# Patient Record
Sex: MALE | Race: WHITE | NOT HISPANIC OR LATINO | Employment: OTHER | URBAN - METROPOLITAN AREA
[De-identification: names, ages, dates, MRNs, and addresses within clinical notes are randomized per-mention and may not be internally consistent; named-entity substitution may affect disease eponyms.]

---

## 2022-11-23 ENCOUNTER — DOCUMENTATION ONLY (OUTPATIENT)
Dept: HEMATOLOGY/ONCOLOGY | Facility: CLINIC | Age: 72
End: 2022-11-23
Payer: MEDICARE

## 2022-11-23 ENCOUNTER — LAB VISIT (OUTPATIENT)
Dept: LAB | Facility: HOSPITAL | Age: 72
End: 2022-11-23
Attending: INTERNAL MEDICINE
Payer: MEDICARE

## 2022-11-23 ENCOUNTER — OFFICE VISIT (OUTPATIENT)
Dept: HEMATOLOGY/ONCOLOGY | Facility: CLINIC | Age: 72
End: 2022-11-23
Payer: MEDICARE

## 2022-11-23 VITALS
RESPIRATION RATE: 20 BRPM | HEIGHT: 72 IN | TEMPERATURE: 98 F | DIASTOLIC BLOOD PRESSURE: 85 MMHG | WEIGHT: 281.31 LBS | SYSTOLIC BLOOD PRESSURE: 194 MMHG | OXYGEN SATURATION: 97 % | BODY MASS INDEX: 38.1 KG/M2 | HEART RATE: 67 BPM

## 2022-11-23 DIAGNOSIS — C84.44 PERIPHERAL T CELL LYMPHOMA OF AXILLARY LYMPH NODES: Primary | ICD-10-CM

## 2022-11-23 DIAGNOSIS — T14.8XXD WOUND HEALING, DELAYED: ICD-10-CM

## 2022-11-23 DIAGNOSIS — C84.44 PERIPHERAL T CELL LYMPHOMA OF AXILLARY LYMPH NODES: ICD-10-CM

## 2022-11-23 DIAGNOSIS — I10 PRIMARY HYPERTENSION: ICD-10-CM

## 2022-11-23 DIAGNOSIS — E11.9 TYPE 2 DIABETES MELLITUS WITHOUT COMPLICATION, WITHOUT LONG-TERM CURRENT USE OF INSULIN: ICD-10-CM

## 2022-11-23 DIAGNOSIS — M10.09 ACUTE IDIOPATHIC GOUT OF MULTIPLE SITES: ICD-10-CM

## 2022-11-23 LAB
ALBUMIN SERPL BCP-MCNC: 3.7 G/DL (ref 3.5–5.2)
ALP SERPL-CCNC: 70 U/L (ref 55–135)
ALT SERPL W/O P-5'-P-CCNC: 71 U/L (ref 10–44)
ANION GAP SERPL CALC-SCNC: 9 MMOL/L (ref 8–16)
ANISOCYTOSIS BLD QL SMEAR: SLIGHT
AST SERPL-CCNC: 33 U/L (ref 10–40)
BASOPHILS NFR BLD: 0 % (ref 0–1.9)
BILIRUB SERPL-MCNC: 0.8 MG/DL (ref 0.1–1)
BUN SERPL-MCNC: 14 MG/DL (ref 8–23)
CALCIUM SERPL-MCNC: 9.5 MG/DL (ref 8.7–10.5)
CHLORIDE SERPL-SCNC: 101 MMOL/L (ref 95–110)
CO2 SERPL-SCNC: 25 MMOL/L (ref 23–29)
CREAT SERPL-MCNC: 0.8 MG/DL (ref 0.5–1.4)
DIFFERENTIAL METHOD: ABNORMAL
EOSINOPHIL NFR BLD: 2 % (ref 0–8)
ERYTHROCYTE [DISTWIDTH] IN BLOOD BY AUTOMATED COUNT: 13.2 % (ref 11.5–14.5)
EST. GFR  (NO RACE VARIABLE): >60 ML/MIN/1.73 M^2
GLUCOSE SERPL-MCNC: 230 MG/DL (ref 70–110)
HCT VFR BLD AUTO: 28.4 % (ref 40–54)
HGB BLD-MCNC: 9.9 G/DL (ref 14–18)
HYPOCHROMIA BLD QL SMEAR: ABNORMAL
IMM GRANULOCYTES # BLD AUTO: ABNORMAL K/UL (ref 0–0.04)
IMM GRANULOCYTES NFR BLD AUTO: ABNORMAL % (ref 0–0.5)
LDH SERPL L TO P-CCNC: 131 U/L (ref 110–260)
LYMPHOCYTES NFR BLD: 76 % (ref 18–48)
MCH RBC QN AUTO: 33.3 PG (ref 27–31)
MCHC RBC AUTO-ENTMCNC: 34.9 G/DL (ref 32–36)
MCV RBC AUTO: 96 FL (ref 82–98)
MONOCYTES NFR BLD: 4 % (ref 4–15)
NEUTROPHILS NFR BLD: 18 % (ref 38–73)
NRBC BLD-RTO: 0 /100 WBC
OVALOCYTES BLD QL SMEAR: ABNORMAL
PHOSPHATE SERPL-MCNC: 3 MG/DL (ref 2.7–4.5)
PLATELET # BLD AUTO: 97 K/UL (ref 150–450)
PLATELET BLD QL SMEAR: ABNORMAL
PMV BLD AUTO: 11.3 FL (ref 9.2–12.9)
POIKILOCYTOSIS BLD QL SMEAR: SLIGHT
POTASSIUM SERPL-SCNC: 4.3 MMOL/L (ref 3.5–5.1)
PROT SERPL-MCNC: 6.7 G/DL (ref 6–8.4)
RBC # BLD AUTO: 2.97 M/UL (ref 4.6–6.2)
SODIUM SERPL-SCNC: 135 MMOL/L (ref 136–145)
URATE SERPL-MCNC: 5.3 MG/DL (ref 3.4–7)
WBC # BLD AUTO: 0.26 K/UL (ref 3.9–12.7)

## 2022-11-23 PROCEDURE — 36415 COLL VENOUS BLD VENIPUNCTURE: CPT | Performed by: INTERNAL MEDICINE

## 2022-11-23 PROCEDURE — 84550 ASSAY OF BLOOD/URIC ACID: CPT | Performed by: INTERNAL MEDICINE

## 2022-11-23 PROCEDURE — 99205 PR OFFICE/OUTPT VISIT, NEW, LEVL V, 60-74 MIN: ICD-10-PCS | Mod: S$PBB,,, | Performed by: INTERNAL MEDICINE

## 2022-11-23 PROCEDURE — 85027 COMPLETE CBC AUTOMATED: CPT | Performed by: INTERNAL MEDICINE

## 2022-11-23 PROCEDURE — 85007 BL SMEAR W/DIFF WBC COUNT: CPT | Performed by: INTERNAL MEDICINE

## 2022-11-23 PROCEDURE — 83615 LACTATE (LD) (LDH) ENZYME: CPT | Performed by: INTERNAL MEDICINE

## 2022-11-23 PROCEDURE — 99203 OFFICE O/P NEW LOW 30 MIN: CPT | Mod: PBBFAC | Performed by: INTERNAL MEDICINE

## 2022-11-23 PROCEDURE — 99999 PR PBB SHADOW E&M-NEW PATIENT-LVL III: CPT | Mod: PBBFAC,,, | Performed by: INTERNAL MEDICINE

## 2022-11-23 PROCEDURE — 80053 COMPREHEN METABOLIC PANEL: CPT | Performed by: INTERNAL MEDICINE

## 2022-11-23 PROCEDURE — 99999 PR PBB SHADOW E&M-NEW PATIENT-LVL III: ICD-10-PCS | Mod: PBBFAC,,, | Performed by: INTERNAL MEDICINE

## 2022-11-23 PROCEDURE — 99205 OFFICE O/P NEW HI 60 MIN: CPT | Mod: S$PBB,,, | Performed by: INTERNAL MEDICINE

## 2022-11-23 PROCEDURE — 84100 ASSAY OF PHOSPHORUS: CPT | Performed by: INTERNAL MEDICINE

## 2022-11-23 RX ORDER — SULFAMETHOXAZOLE AND TRIMETHOPRIM 800; 160 MG/1; MG/1
1 TABLET ORAL
COMMUNITY
Start: 2022-11-16 | End: 2023-01-23 | Stop reason: SDUPTHER

## 2022-11-23 RX ORDER — TRIAMCINOLONE ACETONIDE 1 MG/G
CREAM TOPICAL
COMMUNITY
Start: 2022-09-26

## 2022-11-23 RX ORDER — MUPIROCIN 20 MG/G
OINTMENT TOPICAL
COMMUNITY
Start: 2022-07-04

## 2022-11-23 RX ORDER — OXYCODONE HYDROCHLORIDE 15 MG/1
7.5 TABLET ORAL
COMMUNITY
Start: 2022-10-28

## 2022-11-23 RX ORDER — LISINOPRIL 10 MG/1
10 TABLET ORAL
COMMUNITY
Start: 2022-07-05 | End: 2023-06-01

## 2022-11-23 RX ORDER — PROCHLORPERAZINE MALEATE 10 MG
10 TABLET ORAL
COMMUNITY
Start: 2022-10-28

## 2022-11-23 RX ORDER — CLOBETASOL PROPIONATE 0.5 MG/G
OINTMENT TOPICAL
COMMUNITY
Start: 2022-07-04

## 2022-11-23 RX ORDER — ONDANSETRON HYDROCHLORIDE 8 MG/1
8 TABLET, FILM COATED ORAL
COMMUNITY
Start: 2022-10-28

## 2022-11-23 RX ORDER — FAMOTIDINE 20 MG/1
20 TABLET, FILM COATED ORAL
COMMUNITY
Start: 2022-10-28 | End: 2023-05-18

## 2022-11-23 RX ORDER — VALACYCLOVIR HYDROCHLORIDE 500 MG/1
500 TABLET, FILM COATED ORAL
COMMUNITY
Start: 2022-10-28 | End: 2023-01-27 | Stop reason: SDUPTHER

## 2022-11-23 RX ORDER — ALLOPURINOL 300 MG/1
300 TABLET ORAL
COMMUNITY
Start: 2022-11-16 | End: 2023-01-13 | Stop reason: SDUPTHER

## 2022-11-23 NOTE — PLAN OF CARE
START ON PATHWAY REGIMEN - Lymphoma and CLL    [id number]        Cyclophosphamide       Doxorubicin       Prednisone       Brentuximab vedotin (Adcetris)           Additional Orders: LVEF assessment prior to initiation of doxorubicin   and as clinically indicated. Max recommended cumulative doxorubicin dose = 450   mg/m2. In the Chrissy CORMIER et al. study, patients received 6 to 8 cycles.    **Always confirm dose/schedule in your pharmacy ordering system**    Patient Characteristics:  T-Cell Lymphoma (Systemic), First Line, AITL (Angioimmunoblastic T-Cell   Lymphoma) or ALCL (Anaplastic Large Cell Lymphoma) or Peripheral T-Cell, NOS,   CD30 Positive  Disease Type: Not Applicable  Disease Type: T-Cell Lymphoma (Systemic)  Disease Type: Not Applicable  Line of Therapy: First Line  T-Cell Lymphoma Subtype: Peripheral T-Cell Lymphoma, NOS  CD30 Status: CD30 Positive  Intent of Therapy:  Curative Intent, Discussed with Patient

## 2022-11-23 NOTE — PROGRESS NOTES
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 Has received the first 2 cycles of chemo at [facility] and recently relocated to [place] at least temporarily and would like to continue therapy. Currently residing with ex-wife and ex-sister in law.      Hematologic History of his Mycosis fungoides:  --2020: developed rash on legs, initially treated as ringworm with local physician  --9/16/21: Biopsy with local dermatologist with [facility], read at [facility] per below, initially treated with silvadene. Pt did not have insurance so no further work-up was performed.  --10/2021: Started developing tumors, did not see dermatologist again due to insurance issues, they sent rx for halobetasol and valchlor. He was not able to start valchlor due to cost.  --1/2022: Started tanning bed, tumors worsening    --PET on 9/26/22:  1. New and increased lymphadenopathy left axilla, right groin, and right popliteal fossa.  2. New F-18 FDG avid intramuscular mass in the right calf.   3. Increased size and avidity of the subcutaneous left gluteal cleft lesion.   4. Decreased size and avidity of cutaneous lesions at the left eyelid, left upper back, left arm, right lower abdominal wall, bilateral groin, and left foot.   5. Decreased/resolved mildly F-18 FDG avid left cervical lymph nodes.     10/4/2022 - 10/5/2022 TX-Radiation Therapy   Left inner thigh 8Gy in 2 fractions     Reportedly hospitalized the week of 10/24/22 for worsening skin infections and received CHP+ Brentuximab while inpatient.     C1D1: 10/28, C2D1:11/17    He continues to endorse ongoing open wounds to extremities, which he keeps covered with bandages for a week + at a time with a single bandage. + drainage from  lesions.    Experienced generalized itching for several days after PET scan    PAST MEDICAL HISTORY:     Diabetes  Gout  Hypertension    PAST SURGICAL HISTORY:   No past surgical history on file.    PAST SOCIAL HISTORY:       FAMILY HISTORY:  No family history on file.    CURRENT MEDICATIONS:   No current outpatient medications on file.     No current facility-administered medications for this visit.     ALLERGIES:   Review of patient's allergies indicates:  Not on File      Review of Systems:     Review of Systems   Constitutional:  Positive for fatigue. Negative for appetite change, chills, diaphoresis, fever and unexpected weight change.   HENT:   Negative for hearing loss, mouth sores, nosebleeds, sore throat, trouble swallowing and voice change.    Eyes:  Negative for eye problems and icterus.   Respiratory:  Negative for chest tightness, cough, hemoptysis, shortness of breath and wheezing.    Cardiovascular:  Negative for chest pain, leg swelling and palpitations.   Gastrointestinal:  Negative for abdominal distention, abdominal pain, blood in stool, diarrhea, nausea and vomiting.   Endocrine: Negative for hot flashes.   Genitourinary:  Negative for bladder incontinence, difficulty urinating, dysuria and hematuria.    Musculoskeletal:  Negative for arthralgias, back pain, flank pain, gait problem, myalgias, neck pain and neck stiffness.   Skin:  Positive for itching and wound. Negative for rash.   Neurological:  Negative for dizziness, extremity weakness, gait problem, headaches, numbness, seizures and speech difficulty.   Hematological:  Negative for adenopathy. Does not bruise/bleed easily.   Psychiatric/Behavioral:  Negative for confusion, depression and sleep disturbance. The patient is not nervous/anxious.         Physical Exam:     Vitals:    11/23/22 1334   BP: (!) 194/85   Pulse: 67   Resp: 20   Temp: 98.4 °F (36.9 °C)     Physical Exam  Constitutional:       General: He is not in acute distress.      Appearance: He is well-developed. He is not diaphoretic.   HENT:      Head: Normocephalic and atraumatic.      Mouth/Throat:      Pharynx: No oropharyngeal exudate.   Eyes:      Conjunctiva/sclera: Conjunctivae normal.      Pupils: Pupils are equal, round, and reactive to light.   Neck:      Thyroid: No thyromegaly.      Vascular: No JVD.      Trachea: No tracheal deviation.   Cardiovascular:      Rate and Rhythm: Normal rate and regular rhythm.      Heart sounds: Normal heart sounds. No murmur heard.    No friction rub.   Pulmonary:      Effort: Pulmonary effort is normal. No respiratory distress.      Breath sounds: Normal breath sounds. No stridor. No wheezing or rales.   Chest:      Chest wall: No tenderness.   Abdominal:      General: Bowel sounds are normal. There is no distension.      Palpations: Abdomen is soft.      Tenderness: There is no abdominal tenderness. There is no guarding or rebound.   Musculoskeletal:         General: No tenderness or deformity. Normal range of motion.      Cervical back: Normal range of motion and neck supple.   Skin:     General: Skin is warm and dry.      Capillary Refill: Capillary refill takes less than 2 seconds.      Coloration: Skin is not pale.      Findings: Lesion present. No erythema or rash.      Comments: Numerous areas covered by large bandages. Evidence of drainage soaking the bandage   Neurological:      Mental Status: He is alert and oriented to person, place, and time.      Cranial Nerves: No cranial nerve deficit.      Sensory: No sensory deficit.      Motor: No abnormal muscle tone.      Coordination: Coordination normal.      Deep Tendon Reflexes: Reflexes normal.   Psychiatric:         Behavior: Behavior normal.         Thought Content: Thought content normal.         Judgment: Judgment normal.       ECOG Performance Status: (foot note - ECOG PS provided by Eastern Cooperative Oncology Group) 0 - Asymptomatic    Karnofsky Performance Score:  100%- Normal,  No Complaints, No Evidence of Disease    Labs:   Lab Results   Component Value Date    WBC 0.26 (LL) 11/23/2022    HGB 9.9 (L) 11/23/2022    HCT 28.4 (L) 11/23/2022    PLT 97 (L) 11/23/2022    TRIG 329 (H) 10/19/2022    ALT 71 (H) 11/23/2022    AST 33 11/23/2022     (L) 11/23/2022    K 4.3 11/23/2022     11/23/2022    CREATININE 0.8 11/23/2022    BUN 14 11/23/2022    CO2 25 11/23/2022    HGBA1C 6.0 (H) 10/26/2022         Imaging: Previous imaging has been reviewed     Assessment and Plan:     Mr. Armstrong is a pleasant 72 year old male with  peripheral T cell lymphoma    Peripheral T Cell Lymphoma  --stage IIB ([id number])   --lymph node biopsy as PERIPHERAL T-CELL LYMPHOMA WITH EXTENSIVE NECROSIS-  --Plan to continue CHP-BV for 4 more cycles [received first 2 cycles at [facility]]  --On triple prophylaxis from [facility]  --Plan for port prior to next treatment  --See me prior to next cycle for chemo consent at this facility  --Advised to receive the remainder of therapy at the same facility    DVT  --On eliquis    Hypertension  --Currently taking lisinopril  --Poorly controlled based on our reads  --Will add additional agent      75 minutes were spent face to face with the patient to discuss the disease, natural history, treatment options and survival statistics. I have provided the patient with an opportunity to ask questions and have all questions answered to his satisfaction.       he will return to clinic the day of therapy, but knows to call in the interim if symptoms change or should a problem arise.        Teodora Rashid MD  Hematology and Medical Oncology  Bone Marrow Transplant  [facility] [[email or web address]]      BMT Chart Routing  Urgent    Follow up with physician . 1. port placement 2.echo 3.insurance auth and schedule brentux + CVP chemo for 12/8  4. see me on 12/7 with labs [okay ot over book me if needed]   Follow up with MICHELLE    Provider visit type    Infusion scheduling note    Injection scheduling note     Labs CBC, CMP, LDH and uric acid   Lab interval:     Imaging    Pharmacy appointment    Other referrals

## 2022-11-28 PROBLEM — M10.9 GOUT OF MULTIPLE SITES: Status: ACTIVE | Noted: 2022-11-28

## 2022-11-28 PROBLEM — T14.8XXD WOUND HEALING, DELAYED: Status: ACTIVE | Noted: 2022-11-28

## 2022-11-28 PROBLEM — I10 HTN (HYPERTENSION): Status: ACTIVE | Noted: 2022-11-28

## 2022-11-28 PROBLEM — E11.9 TYPE 2 DIABETES MELLITUS WITHOUT COMPLICATION, WITHOUT LONG-TERM CURRENT USE OF INSULIN: Status: ACTIVE | Noted: 2022-11-28

## 2022-11-30 DIAGNOSIS — C84.44 PERIPHERAL T CELL LYMPHOMA OF AXILLARY LYMPH NODES: Primary | ICD-10-CM

## 2022-12-01 ENCOUNTER — TELEPHONE (OUTPATIENT)
Dept: HEMATOLOGY/ONCOLOGY | Facility: CLINIC | Age: 72
End: 2022-12-01
Payer: MEDICARE

## 2022-12-05 ENCOUNTER — HOSPITAL ENCOUNTER (OUTPATIENT)
Dept: CARDIOLOGY | Facility: HOSPITAL | Age: 72
Discharge: HOME | End: 2022-12-05
Attending: INTERNAL MEDICINE
Payer: MEDICARE

## 2022-12-05 VITALS
DIASTOLIC BLOOD PRESSURE: 85 MMHG | HEIGHT: 72 IN | WEIGHT: 281 LBS | BODY MASS INDEX: 38.06 KG/M2 | SYSTOLIC BLOOD PRESSURE: 194 MMHG | HEART RATE: 88 BPM

## 2022-12-05 DIAGNOSIS — C84.44 PERIPHERAL T CELL LYMPHOMA OF AXILLARY LYMPH NODES: ICD-10-CM

## 2022-12-05 LAB
ASCENDING AORTA: 3.6 CM
AV INDEX (PROSTH): 0.65
AV MEAN GRADIENT: 9 MMHG
AV PEAK GRADIENT: 14 MMHG
AV VALVE AREA: 2.12 CM2
AV VELOCITY RATIO: 0.55
BSA FOR ECHO PROCEDURE: 2.54 M2
CV ECHO LV RWT: 0.45 CM
DOP CALC AO PEAK VEL: 1.88 M/S
DOP CALC AO VTI: 34.83 CM
DOP CALC LVOT AREA: 3.3 CM2
DOP CALC LVOT DIAMETER: 2.04 CM
DOP CALC LVOT PEAK VEL: 1.04 M/S
DOP CALC LVOT STROKE VOLUME: 73.8 CM3
DOP CALCLVOT PEAK VEL VTI: 22.59 CM
E WAVE DECELERATION TIME: 227.98 MSEC
E/A RATIO: 0.73
E/E' RATIO: 8.74 M/S
ECHO LV POSTERIOR WALL: 0.95 CM (ref 0.6–1.1)
EJECTION FRACTION: 60 %
FRACTIONAL SHORTENING: 46 % (ref 28–44)
INTERVENTRICULAR SEPTUM: 0.9 CM (ref 0.6–1.1)
IVRT: 85.63 MSEC
LA MAJOR: 5.5 CM
LA MINOR: 5.49 CM
LA WIDTH: 4.96 CM
LEFT ATRIUM SIZE: 3.77 CM
LEFT ATRIUM VOLUME INDEX MOD: 43 ML/M2
LEFT ATRIUM VOLUME INDEX: 35.5 ML/M2
LEFT ATRIUM VOLUME MOD: 105.72 CM3
LEFT ATRIUM VOLUME: 87.34 CM3
LEFT INTERNAL DIMENSION IN SYSTOLE: 2.32 CM (ref 2.1–4)
LEFT VENTRICLE DIASTOLIC VOLUME INDEX: 33.1 ML/M2
LEFT VENTRICLE DIASTOLIC VOLUME: 81.42 ML
LEFT VENTRICLE MASS INDEX: 51 G/M2
LEFT VENTRICLE SYSTOLIC VOLUME INDEX: 7.5 ML/M2
LEFT VENTRICLE SYSTOLIC VOLUME: 18.5 ML
LEFT VENTRICULAR INTERNAL DIMENSION IN DIASTOLE: 4.26 CM (ref 3.5–6)
LEFT VENTRICULAR MASS: 126.06 G
LV LATERAL E/E' RATIO: 7.55 M/S
LV SEPTAL E/E' RATIO: 10.38 M/S
MV A" WAVE DURATION": 12.56 MSEC
MV PEAK A VEL: 1.14 M/S
MV PEAK E VEL: 0.83 M/S
MV STENOSIS PRESSURE HALF TIME: 66.12 MS
MV VALVE AREA P 1/2 METHOD: 3.33 CM2
PISA TR MAX VEL: 2.22 M/S
PULM VEIN S/D RATIO: 1.59
PV PEAK D VEL: 0.49 M/S
PV PEAK S VEL: 0.78 M/S
QEF: 61 %
RA MAJOR: 4.14 CM
RA PRESSURE: 3 MMHG
RA WIDTH: 3.64 CM
RIGHT VENTRICULAR END-DIASTOLIC DIMENSION: 3.88 CM
RV TISSUE DOPPLER FREE WALL SYSTOLIC VELOCITY 1 (APICAL 4 CHAMBER VIEW): 17.64 CM/S
SINUS: 2.64 CM
STJ: 2.73 CM
TDI LATERAL: 0.11 M/S
TDI SEPTAL: 0.08 M/S
TDI: 0.1 M/S
TR MAX PG: 20 MMHG
TRICUSPID ANNULAR PLANE SYSTOLIC EXCURSION: 2.89 CM
TV REST PULMONARY ARTERY PRESSURE: 23 MMHG

## 2022-12-05 PROCEDURE — 93356 MYOCRD STRAIN IMG SPCKL TRCK: CPT | Mod: ,,, | Performed by: INTERNAL MEDICINE

## 2022-12-05 PROCEDURE — 93356 ECHO (CUPID ONLY): ICD-10-PCS | Mod: ,,, | Performed by: INTERNAL MEDICINE

## 2022-12-05 PROCEDURE — 93306 TTE W/DOPPLER COMPLETE: CPT | Mod: 26,,, | Performed by: INTERNAL MEDICINE

## 2022-12-05 PROCEDURE — 93306 ECHO (CUPID ONLY): ICD-10-PCS | Mod: 26,,, | Performed by: INTERNAL MEDICINE

## 2022-12-05 PROCEDURE — 93356 MYOCRD STRAIN IMG SPCKL TRCK: CPT

## 2022-12-07 ENCOUNTER — OFFICE VISIT (OUTPATIENT)
Dept: HEMATOLOGY/ONCOLOGY | Facility: CLINIC | Age: 72
End: 2022-12-07
Payer: MEDICARE

## 2022-12-07 ENCOUNTER — NURSE TRIAGE (OUTPATIENT)
Dept: ADMINISTRATIVE | Facility: CLINIC | Age: 72
End: 2022-12-07
Payer: MEDICARE

## 2022-12-07 ENCOUNTER — PATIENT MESSAGE (OUTPATIENT)
Dept: HEMATOLOGY/ONCOLOGY | Facility: CLINIC | Age: 72
End: 2022-12-07

## 2022-12-07 VITALS
TEMPERATURE: 98 F | OXYGEN SATURATION: 98 % | BODY MASS INDEX: 37.12 KG/M2 | SYSTOLIC BLOOD PRESSURE: 128 MMHG | DIASTOLIC BLOOD PRESSURE: 72 MMHG | HEIGHT: 72 IN | RESPIRATION RATE: 17 BRPM | WEIGHT: 274.06 LBS | HEART RATE: 86 BPM

## 2022-12-07 DIAGNOSIS — I10 PRIMARY HYPERTENSION: ICD-10-CM

## 2022-12-07 DIAGNOSIS — E11.9 TYPE 2 DIABETES MELLITUS WITHOUT COMPLICATION, WITHOUT LONG-TERM CURRENT USE OF INSULIN: ICD-10-CM

## 2022-12-07 DIAGNOSIS — C84.44 PERIPHERAL T CELL LYMPHOMA OF AXILLARY LYMPH NODES: ICD-10-CM

## 2022-12-07 DIAGNOSIS — C84.09 MYCOSIS FUNGOIDES, EXTRANODAL AND SOLID ORGAN SITES: Primary | ICD-10-CM

## 2022-12-07 PROCEDURE — 99215 OFFICE O/P EST HI 40 MIN: CPT | Mod: S$PBB,,, | Performed by: INTERNAL MEDICINE

## 2022-12-07 PROCEDURE — 99213 OFFICE O/P EST LOW 20 MIN: CPT | Mod: PBBFAC | Performed by: INTERNAL MEDICINE

## 2022-12-07 PROCEDURE — 99999 PR PBB SHADOW E&M-EST. PATIENT-LVL III: ICD-10-PCS | Mod: PBBFAC,,, | Performed by: INTERNAL MEDICINE

## 2022-12-07 PROCEDURE — 99215 PR OFFICE/OUTPT VISIT, EST, LEVL V, 40-54 MIN: ICD-10-PCS | Mod: S$PBB,,, | Performed by: INTERNAL MEDICINE

## 2022-12-07 PROCEDURE — 99999 PR PBB SHADOW E&M-EST. PATIENT-LVL III: CPT | Mod: PBBFAC,,, | Performed by: INTERNAL MEDICINE

## 2022-12-07 NOTE — PROGRESS NOTES
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 Has received the first 2 cycles of chemo at [facility] and recently relocated to [place] at least temporarily and would like to continue therapy. Currently residing with ex-wife and ex-sister in law.      Hematologic History of his Mycosis fungoides:  --2020: developed rash on legs, initially treated as ringworm with local physician  --9/16/21: Biopsy with local dermatologist with [facility], read at [facility] per below, initially treated with silvadene. Pt did not have insurance so no further work-up was performed.  --10/2021: Started developing tumors, did not see dermatologist again due to insurance issues, they sent rx for halobetasol and valchlor. He was not able to start valchlor due to cost.  --1/2022: Started tanning bed, tumors worsening    --PET on 9/26/22:  1. New and increased lymphadenopathy left axilla, right groin, and right popliteal fossa.  2. New F-18 FDG avid intramuscular mass in the right calf.   3. Increased size and avidity of the subcutaneous left gluteal cleft lesion.   4. Decreased size and avidity of cutaneous lesions at the left eyelid, left upper back, left arm, right lower abdominal wall, bilateral groin, and left foot.   5. Decreased/resolved mildly F-18 FDG avid left cervical lymph nodes.     10/4/2022 - 10/5/2022 TX-Radiation Therapy   Left inner thigh 8Gy in 2 fractions     Reportedly hospitalized the week of 10/24/22 for worsening skin infections and received CHP+ Brentuximab while inpatient.     C1D1: 10/28, C2D1:11/17    He continues to endorse ongoing open wounds to extremities, which he keeps covered with bandages for a week + at a time with a single bandage. + drainage from lesions.    Experienced generalized itching for several  days after PET scan    Interval History:  Mr. Armstrong is doing well. Wounds continue to heal. Left axillary mass has become more painful, larger and closer to the surface. He's scared the lesion will break through the surface.     PAST MEDICAL HISTORY:     Diabetes  Gout  Hypertension    PAST SURGICAL HISTORY:   No past surgical history on file.    PAST SOCIAL HISTORY:       FAMILY HISTORY:  No family history on file.    CURRENT MEDICATIONS:   Current Outpatient Medications   Medication Sig    allopurinoL (ZYLOPRIM) 300 MG tablet Take 300 mg by mouth.    apixaban (ELIQUIS) 5 mg Tab Take 5 mg by mouth.    clobetasol 0.05% (TEMOVATE) 0.05 % Oint Apply topically.    famotidine (PEPCID) 20 MG tablet Take 20 mg by mouth.    lisinopriL 10 MG tablet Take 10 mg by mouth.    mupirocin (BACTROBAN) 2 % ointment Apply topically.    ondansetron (ZOFRAN) 8 MG tablet Take 8 mg by mouth.    oxyCODONE (ROXICODONE) 15 MG Tab Take 7.5 mg by mouth.    prochlorperazine (COMPAZINE) 10 MG tablet Take 10 mg by mouth.    sulfamethoxazole-trimethoprim 800-160mg (BACTRIM DS) 800-160 mg Tab Take 1 tablet by mouth.    triamcinolone acetonide 0.1% (KENALOG) 0.1 % cream Apply topically.    valACYclovir (VALTREX) 500 MG tablet Take 500 mg by mouth.     No current facility-administered medications for this visit.     ALLERGIES:   Review of patient's allergies indicates:   Allergen Reactions    Iodinated contrast media Other (See Comments)     Pt states that he looks sunburnt afterwards    Vancomycin analogues Other (See Comments)     Red man syndrome         Review of Systems:     Review of Systems   Constitutional:  Positive for fatigue. Negative for appetite change, chills, diaphoresis, fever and unexpected weight change.   HENT:   Negative for hearing loss, mouth sores, nosebleeds, sore throat, trouble swallowing and voice change.    Eyes:  Negative for eye problems and icterus.   Respiratory:  Negative for chest tightness, cough, hemoptysis,  shortness of breath and wheezing.    Cardiovascular:  Negative for chest pain, leg swelling and palpitations.   Gastrointestinal:  Negative for abdominal distention, abdominal pain, blood in stool, diarrhea, nausea and vomiting.   Endocrine: Negative for hot flashes.   Genitourinary:  Negative for bladder incontinence, difficulty urinating, dysuria and hematuria.    Musculoskeletal:  Negative for arthralgias, back pain, flank pain, gait problem, myalgias, neck pain and neck stiffness.   Skin:  Positive for itching and wound. Negative for rash.   Neurological:  Negative for dizziness, extremity weakness, gait problem, headaches, numbness, seizures and speech difficulty.   Hematological:  Negative for adenopathy. Does not bruise/bleed easily.   Psychiatric/Behavioral:  Negative for confusion, depression and sleep disturbance. The patient is not nervous/anxious.         Physical Exam:     Vitals:    12/07/22 0930   BP: 128/72   Pulse: 86   Resp: 17   Temp: 98.3 °F (36.8 °C)     Physical Exam  Constitutional:       General: He is not in acute distress.     Appearance: He is well-developed. He is not diaphoretic.   HENT:      Head: Normocephalic and atraumatic.      Mouth/Throat:      Pharynx: No oropharyngeal exudate.   Eyes:      Conjunctiva/sclera: Conjunctivae normal.      Pupils: Pupils are equal, round, and reactive to light.   Neck:      Thyroid: No thyromegaly.      Vascular: No JVD.      Trachea: No tracheal deviation.   Cardiovascular:      Rate and Rhythm: Normal rate and regular rhythm.      Heart sounds: Normal heart sounds. No murmur heard.    No friction rub.   Pulmonary:      Effort: Pulmonary effort is normal. No respiratory distress.      Breath sounds: Normal breath sounds. No stridor. No wheezing or rales.   Chest:      Chest wall: No tenderness.   Abdominal:      General: Bowel sounds are normal. There is no distension.      Palpations: Abdomen is soft.      Tenderness: There is no abdominal  tenderness. There is no guarding or rebound.   Musculoskeletal:         General: No tenderness or deformity. Normal range of motion.      Cervical back: Normal range of motion and neck supple.      Comments: + axillary mass. Red tender and hard   Skin:     General: Skin is warm and dry.      Capillary Refill: Capillary refill takes less than 2 seconds.      Coloration: Skin is not pale.      Findings: Lesion present. No erythema or rash.      Comments: Numerous areas covered by large bandages. Evidence of drainage soaking the bandage   Neurological:      Mental Status: He is alert and oriented to person, place, and time.      Cranial Nerves: No cranial nerve deficit.      Sensory: No sensory deficit.      Motor: No abnormal muscle tone.      Coordination: Coordination normal.      Deep Tendon Reflexes: Reflexes normal.   Psychiatric:         Behavior: Behavior normal.         Thought Content: Thought content normal.         Judgment: Judgment normal.       ECOG Performance Status: (foot note - ECOG PS provided by Eastern Cooperative Oncology Group) 0 - Asymptomatic    Karnofsky Performance Score:  100%- Normal, No Complaints, No Evidence of Disease    Labs:   Lab Results   Component Value Date    WBC 5.38 12/05/2022    HGB 10.6 (L) 12/05/2022    HCT 32.2 (L) 12/05/2022     12/05/2022    TRIG 329 (H) 10/19/2022    ALT 42 12/05/2022    AST 41 (H) 12/05/2022     (L) 12/05/2022    K 4.7 12/05/2022     12/05/2022    CREATININE 0.9 12/05/2022    BUN 12 12/05/2022    CO2 25 12/05/2022    HGBA1C 6.0 (H) 10/26/2022         Imaging: Previous imaging has been reviewed     Assessment and Plan:     Mr. Armstrong is a pleasant 72 year old male with  peripheral T cell lymphoma    Peripheral T Cell Lymphoma  --stage IIB ([id number])   --lymph node biopsy as PERIPHERAL T-CELL LYMPHOMA WITH EXTENSIVE NECROSIS-  --Plan to continue CHP-BV for 4 more cycles [received first 2 cycles at [facility]]  --On triple prophylaxis  from [facility]  --Plan for port tomorrow  --chemo consent at this facility signed today  --Advised to receive the remainder of therapy at the same facility  --Will consult radiation oncology for acute worsening of left axillary tumor pain    DVT  --On eliquis    Hypertension  --Currently taking lisinopril  --Poorly controlled based on our reads  --Will add additional agent      75 minutes were spent face to face with the patient to discuss the disease, natural history, treatment options and survival statistics. I have provided the patient with an opportunity to ask questions and have all questions answered to his satisfaction.       he will return to clinic the day of therapy, but knows to call in the interim if symptoms change or should a problem arise.        Teodora Rashid MD  Hematology and Medical Oncology  Bone Marrow Transplant  [facility]      BMT Chart Routing  Urgent    Follow up with physician . 1. consult rad onc 2. all other follow up made   Follow up with MICHELLE    Provider visit type    Infusion scheduling note    Injection scheduling note    Labs    Imaging    Pharmacy appointment    Other referrals

## 2022-12-08 ENCOUNTER — OFFICE VISIT (OUTPATIENT)
Dept: URGENT CARE | Facility: CLINIC | Age: 72
End: 2022-12-08
Payer: MEDICARE

## 2022-12-08 VITALS
DIASTOLIC BLOOD PRESSURE: 92 MMHG | WEIGHT: 274 LBS | BODY MASS INDEX: 37.11 KG/M2 | SYSTOLIC BLOOD PRESSURE: 178 MMHG | OXYGEN SATURATION: 97 % | HEART RATE: 67 BPM | HEIGHT: 72 IN | TEMPERATURE: 97 F | RESPIRATION RATE: 18 BRPM

## 2022-12-08 DIAGNOSIS — L02.412 ABSCESS OF LEFT AXILLA: Primary | ICD-10-CM

## 2022-12-08 PROCEDURE — 87070 CULTURE OTHR SPECIMN AEROBIC: CPT | Performed by: FAMILY MEDICINE

## 2022-12-08 PROCEDURE — 99203 OFFICE O/P NEW LOW 30 MIN: CPT | Mod: S$GLB,,, | Performed by: FAMILY MEDICINE

## 2022-12-08 PROCEDURE — 99203 PR OFFICE/OUTPT VISIT, NEW, LEVL III, 30-44 MIN: ICD-10-PCS | Mod: S$GLB,,, | Performed by: FAMILY MEDICINE

## 2022-12-08 RX ORDER — SULFAMETHOXAZOLE AND TRIMETHOPRIM 800; 160 MG/1; MG/1
1 TABLET ORAL 2 TIMES DAILY
Qty: 20 TABLET | Refills: 0 | Status: SHIPPED | OUTPATIENT
Start: 2022-12-08 | End: 2022-12-13

## 2022-12-08 NOTE — PROGRESS NOTES
Subjective:       Patient ID: Randy Armstrong is a 72 y.o. male.    Vitals:  height is 6' (1.829 m) and weight is 124.3 kg (274 lb). His temperature is 97.1 °F (36.2 °C). His blood pressure is 178/92 (abnormal) and his pulse is 67. His respiration is 18 and oxygen saturation is 97%.     Chief Complaint: Abscess (3 months, but burst on 12/07/2022)    Pt has hx of skin ca, mycosis fungoides, got [facility] x 2 at [facility] with c/o having started draining since yesterday, denies fever    Abscess  Chronicity:  NewProgression Since Onset: rapidly worsening  Location:  Shoulder/arm  Characteristics: draining, painful, redness and swelling    Pain Scale:  8/10  Treatments Tried:  Draining/squeezing  Relieved by:  Nothing  Worsened by:  Nothing    Skin:  Positive for abscess. Negative for wound.     Objective:      Physical Exam   Constitutional: He is oriented to person, place, and time. He appears well-developed. He is cooperative.  Non-toxic appearance. He does not appear ill. No distress.   HENT:   Head: Normocephalic and atraumatic.   Ears:   Right Ear: Hearing, tympanic membrane, external ear and ear canal normal.   Left Ear: Hearing, tympanic membrane, external ear and ear canal normal.   Nose: Nose normal. No mucosal edema, rhinorrhea or nasal deformity. No epistaxis. Right sinus exhibits no maxillary sinus tenderness and no frontal sinus tenderness. Left sinus exhibits no maxillary sinus tenderness and no frontal sinus tenderness.   Mouth/Throat: Uvula is midline, oropharynx is clear and moist and mucous membranes are normal. Mucous membranes are moist. No trismus in the jaw. Normal dentition. No uvula swelling. No posterior oropharyngeal erythema. Oropharynx is clear.   Eyes: Conjunctivae and lids are normal. Right eye exhibits no discharge. Left eye exhibits no discharge. No scleral icterus.   Neck: Trachea normal and phonation normal. Neck supple.   Cardiovascular: Normal rate, regular rhythm, normal heart sounds and  normal pulses.   Pulmonary/Chest: Effort normal and breath sounds normal. No respiratory distress.   Abdominal: Normal appearance and bowel sounds are normal. He exhibits no distension and no mass. Soft. There is no abdominal tenderness.   Musculoskeletal: Normal range of motion.         General: No deformity. Normal range of motion.   Neurological: He is alert and oriented to person, place, and time. He exhibits normal muscle tone. Coordination normal.   Skin: Skin is warm, dry, intact, not diaphoretic and not pale.         Comments: Left axilla with small opening, started draining  yellowish drainage, on raising left arm  No induration appreciated     Psychiatric: His speech is normal and behavior is normal. Judgment and thought content normal.   Nursing note and vitals reviewed.      Assessment:       1. Abscess of left axilla          Plan:         Abscess of left axilla           Pt advised to follow up with heme onc specialist

## 2022-12-10 LAB — BACTERIA SPEC AEROBE CULT: NORMAL

## 2022-12-13 ENCOUNTER — TELEPHONE (OUTPATIENT)
Dept: URGENT CARE | Facility: CLINIC | Age: 72
End: 2022-12-13
Payer: MEDICARE

## 2022-12-22 ENCOUNTER — TELEPHONE (OUTPATIENT)
Dept: INTERVENTIONAL RADIOLOGY/VASCULAR | Facility: CLINIC | Age: 72
End: 2022-12-22
Payer: MEDICARE

## 2022-12-22 ENCOUNTER — OFFICE VISIT (OUTPATIENT)
Dept: HEMATOLOGY/ONCOLOGY | Facility: CLINIC | Age: 72
End: 2022-12-22
Payer: MEDICARE

## 2022-12-22 ENCOUNTER — INFUSION (OUTPATIENT)
Dept: INFUSION THERAPY | Facility: HOSPITAL | Age: 72
End: 2022-12-22
Attending: INTERNAL MEDICINE
Payer: MEDICARE

## 2022-12-22 VITALS
SYSTOLIC BLOOD PRESSURE: 158 MMHG | DIASTOLIC BLOOD PRESSURE: 80 MMHG | HEART RATE: 74 BPM | TEMPERATURE: 98 F | RESPIRATION RATE: 18 BRPM | WEIGHT: 280.13 LBS | BODY MASS INDEX: 37.57 KG/M2

## 2022-12-22 VITALS
DIASTOLIC BLOOD PRESSURE: 77 MMHG | HEIGHT: 72 IN | SYSTOLIC BLOOD PRESSURE: 174 MMHG | RESPIRATION RATE: 20 BRPM | WEIGHT: 280.13 LBS | BODY MASS INDEX: 37.94 KG/M2 | OXYGEN SATURATION: 97 % | TEMPERATURE: 98 F | HEART RATE: 67 BPM

## 2022-12-22 DIAGNOSIS — C84.44 PERIPHERAL T CELL LYMPHOMA OF AXILLARY LYMPH NODES: Primary | ICD-10-CM

## 2022-12-22 DIAGNOSIS — C84.09 MYCOSIS FUNGOIDES INVOLVING EXTRANODAL SITE EXCLUDING SPLEEN AND OTHER SOLID ORGANS: Primary | ICD-10-CM

## 2022-12-22 PROBLEM — C84.00 MYCOSIS FUNGOIDES: Status: ACTIVE | Noted: 2022-05-05

## 2022-12-22 PROCEDURE — 96417 CHEMO IV INFUS EACH ADDL SEQ: CPT

## 2022-12-22 PROCEDURE — 25000003 PHARM REV CODE 250: Performed by: INTERNAL MEDICINE

## 2022-12-22 PROCEDURE — 96367 TX/PROPH/DG ADDL SEQ IV INF: CPT

## 2022-12-22 PROCEDURE — 63600175 PHARM REV CODE 636 W HCPCS: Performed by: INTERNAL MEDICINE

## 2022-12-22 PROCEDURE — 99999 PR PBB SHADOW E&M-EST. PATIENT-LVL IV: ICD-10-PCS | Mod: PBBFAC,,, | Performed by: INTERNAL MEDICINE

## 2022-12-22 PROCEDURE — 99215 OFFICE O/P EST HI 40 MIN: CPT | Mod: S$PBB,,, | Performed by: INTERNAL MEDICINE

## 2022-12-22 PROCEDURE — 96413 CHEMO IV INFUSION 1 HR: CPT

## 2022-12-22 PROCEDURE — 99215 PR OFFICE/OUTPT VISIT, EST, LEVL V, 40-54 MIN: ICD-10-PCS | Mod: S$PBB,,, | Performed by: INTERNAL MEDICINE

## 2022-12-22 PROCEDURE — 99214 OFFICE O/P EST MOD 30 MIN: CPT | Mod: PBBFAC,25 | Performed by: INTERNAL MEDICINE

## 2022-12-22 PROCEDURE — 99999 PR PBB SHADOW E&M-EST. PATIENT-LVL IV: CPT | Mod: PBBFAC,,, | Performed by: INTERNAL MEDICINE

## 2022-12-22 RX ORDER — HEPARIN 100 UNIT/ML
500 SYRINGE INTRAVENOUS
Status: CANCELLED | OUTPATIENT
Start: 2022-12-22

## 2022-12-22 RX ORDER — SODIUM CHLORIDE 0.9 % (FLUSH) 0.9 %
10 SYRINGE (ML) INJECTION
Status: DISCONTINUED | OUTPATIENT
Start: 2022-12-22 | End: 2022-12-22 | Stop reason: HOSPADM

## 2022-12-22 RX ORDER — HEPARIN 100 UNIT/ML
500 SYRINGE INTRAVENOUS
Status: DISCONTINUED | OUTPATIENT
Start: 2022-12-22 | End: 2022-12-22 | Stop reason: HOSPADM

## 2022-12-22 RX ORDER — SODIUM CHLORIDE 0.9 % (FLUSH) 0.9 %
10 SYRINGE (ML) INJECTION
Status: CANCELLED | OUTPATIENT
Start: 2022-12-22

## 2022-12-22 RX ADMIN — CYCLOPHOSPHAMIDE 1920 MG: 200 INJECTION, SOLUTION INTRAVENOUS at 12:12

## 2022-12-22 RX ADMIN — DEXAMETHASONE SODIUM PHOSPHATE 0.25 MG: 4 INJECTION, SOLUTION INTRA-ARTICULAR; INTRALESIONAL; INTRAMUSCULAR; INTRAVENOUS; SOFT TISSUE at 11:12

## 2022-12-22 RX ADMIN — BRENTUXIMAB VEDOTIN 180 MG: 50 INJECTION, POWDER, LYOPHILIZED, FOR SOLUTION INTRAVENOUS at 01:12

## 2022-12-22 NOTE — PLAN OF CARE
1100 pt seen by Dr. Jackson will receive Cytoxan and Adcetris today, will not receive Greg, hx, meds, allergies reviewed, pt reclined in chair, continue to monitor

## 2022-12-22 NOTE — PROGRESS NOTES
HEMATOLOGIC MALIGNANCIES PROGRESS NOTE    IDENTIFYING STATEMENT   Randy Armstrong (David) is a 72 y.o. male with a  of 1950 from [place], [place] with the diagnosis of mycosis fungoides.      ONCOLOGY HISTORY:    Per Dr. Rashid's most recent note:    --: developed rash on legs, initially treated as ringworm with local physician  --21: Biopsy with local dermatologist with [facility], read at [facility] per below, initially treated with silvadene. Pt did not have insurance so no further work-up was performed.  --10/2021: Started developing tumors, did not see dermatologist again due to insurance issues, they sent rx for halobetasol and valchlor. He was not able to start valchlor due to cost.  --2022: Started tanning bed, tumors worsening    --PET on 22:  1. New and increased lymphadenopathy left axilla, right groin, and right popliteal fossa.  2. New F-18 FDG avid intramuscular mass in the right calf.   3. Increased size and avidity of the subcutaneous left gluteal cleft lesion.   4. Decreased size and avidity of cutaneous lesions at the left eyelid, left upper back, left arm, right lower abdominal wall, bilateral groin, and left foot.   5. Decreased/resolved mildly F-18 FDG avid left cervical lymph nodes.      10/4/2022 - 10/5/2022 TX-Radiation Therapy   Left inner thigh 8Gy in 2 fractions      Reportedly hospitalized the week of 10/24/22 for worsening skin infections and received CHP+ Brentuximab while inpatient.      C1D1: 10/28, C2D1:     He continues to endorse ongoing open wounds to extremities, which he keeps covered with bandages for a week + at a time with a single bandage. + drainage from lesions.     Experienced generalized itching for several days after PET scan    INTERVAL HISTORY:      Mr. Armstrong is seen in clinic prior to BV+CHP. Unfortunately, his planned central line placement was cancelled due to reporting a draining axillary wound when he was being set up for this. He therefore has no central  line. He states that the area under his left arm has been draining some pus. He presents for evaluation prior to planned chemotherapy.    Past Medical History, Past Social History and Past Family History have been reviewed and are unchanged except as noted in the interval history.    MEDICATIONS:     Current Outpatient Medications on File Prior to Visit   Medication Sig Dispense Refill    allopurinoL (ZYLOPRIM) 300 MG tablet Take 300 mg by mouth.      apixaban (ELIQUIS) 5 mg Tab Take 5 mg by mouth.      ondansetron (ZOFRAN) 8 MG tablet Take 8 mg by mouth.      prochlorperazine (COMPAZINE) 10 MG tablet Take 10 mg by mouth.      sulfamethoxazole-trimethoprim 800-160mg (BACTRIM DS) 800-160 mg Tab Take 1 tablet by mouth.      valACYclovir (VALTREX) 500 MG tablet Take 500 mg by mouth.      clobetasol 0.05% (TEMOVATE) 0.05 % Oint Apply topically.      famotidine (PEPCID) 20 MG tablet Take 20 mg by mouth.      lisinopriL 10 MG tablet Take 10 mg by mouth.      mupirocin (BACTROBAN) 2 % ointment Apply topically.      oxyCODONE (ROXICODONE) 15 MG Tab Take 7.5 mg by mouth.      triamcinolone acetonide 0.1% (KENALOG) 0.1 % cream Apply topically.       No current facility-administered medications on file prior to visit.       ALLERGIES:   Review of patient's allergies indicates:   Allergen Reactions    Iodinated contrast media Other (See Comments)     Pt states that he looks sunburnt afterwards    Vancomycin analogues Other (See Comments)     Red man syndrome        ROS:       Review of Systems   Constitutional:  Negative for diaphoresis, fatigue, fever and unexpected weight change.   HENT:   Negative for lump/mass and sore throat.    Eyes:  Negative for icterus.   Respiratory:  Negative for cough and shortness of breath.    Cardiovascular:  Negative for chest pain and palpitations.   Gastrointestinal:  Negative for abdominal distention, constipation, diarrhea, nausea and vomiting.   Genitourinary:  Negative for dysuria and  "frequency.    Musculoskeletal:  Negative for arthralgias, gait problem and myalgias.   Skin:  Positive for wound. Negative for rash.   Neurological:  Negative for dizziness, gait problem and headaches.   Hematological:  Negative for adenopathy. Does not bruise/bleed easily.   Psychiatric/Behavioral:  The patient is not nervous/anxious.      PHYSICAL EXAM:  Vitals:    12/22/22 0923   BP: (!) 174/77   Pulse: 67   Resp: 20   Temp: 97.9 °F (36.6 °C)   TempSrc: Oral   SpO2: 97%   Weight: 127 kg (280 lb 1.5 oz)   Height: 6' 0.4" (1.839 m)   PainSc: 0-No pain       KARNOFSKY PERFORMANCE STATUS 80%  ECOG 1    Physical Exam  Constitutional:       General: He is not in acute distress.     Appearance: He is well-developed.   HENT:      Head: Normocephalic and atraumatic.      Mouth/Throat:      Mouth: No oral lesions.   Eyes:      Conjunctiva/sclera: Conjunctivae normal.   Neck:      Thyroid: No thyromegaly.   Cardiovascular:      Rate and Rhythm: Normal rate and regular rhythm.      Heart sounds: Normal heart sounds. No murmur heard.  Pulmonary:      Breath sounds: Normal breath sounds. No wheezing or rales.   Abdominal:      General: There is no distension.      Palpations: Abdomen is soft. There is no hepatomegaly, splenomegaly or mass.      Tenderness: There is no abdominal tenderness.   Lymphadenopathy:      Cervical: No cervical adenopathy.      Right cervical: No deep cervical adenopathy.     Left cervical: No deep cervical adenopathy.   Skin:     Findings: No rash.      Comments: Area of tumor in left axilla with small fistula tract and scant purulent drainage with pressure applied. Skin surrounding this tumor is hyperemic but not erythematous or indurated. No other skin lesions identified on upper body or arms (one tumor on left arm was covered with a bandage. Lower extremity tumor not inspected per patient preference.    Neurological:      Mental Status: He is alert and oriented to person, place, and time.      " Cranial Nerves: No cranial nerve deficit.      Coordination: Coordination normal.      Deep Tendon Reflexes: Reflexes are normal and symmetric.       LAB:   Results for orders placed or performed in visit on 12/08/22   CULTURE, AEROBIC  (SPECIFY SOURCE)    Specimen: Arm, Left; Abscess   Result Value Ref Range    Aerobic Bacterial Culture Skin joshua,  no predominant organism        PROBLEMS ASSESSED THIS VISIT:    1. Mycosis fungoides involving extranodal site excluding spleen and other solid organs        PLAN:       Mycosis fungoides  Administer next cycle of chemotherapy today. Unfortunately, he does not have a central line, so we will have to hold doxorubicin as it is a vesicant. We will administer brentuximab, cyclophosphamide, and prednisone.     His wound is open, draining, and has no surrounding cellulitis. Any prior infection is controlled.     Discussed need for line placement with interventional radiology, and they will set this up as soon as possible.    He will follow-up with Dr. Rashid prior to next cycle of chemotherapy.    Follow-up  As scheduled with Dr. Rashid    Route Chart for Scheduling    BMT Chart Routing      Follow up with physician . Already scheduled   Follow up with MICHELLE    Provider visit type    Infusion scheduling note    Injection scheduling note    Labs    Imaging    Pharmacy appointment    Other referrals            Dakota Jackson MD  Hematology and Stem Cell Transplant

## 2022-12-22 NOTE — PLAN OF CARE
1400 pt tolerated cytoxan and adceetris infusion without issue, pt to rtc tomorrow for udenyca injection, no distress noted upon d/c to home

## 2022-12-23 ENCOUNTER — INFUSION (OUTPATIENT)
Dept: INFUSION THERAPY | Facility: HOSPITAL | Age: 72
End: 2022-12-23
Attending: INTERNAL MEDICINE
Payer: MEDICARE

## 2022-12-23 DIAGNOSIS — C84.44 PERIPHERAL T CELL LYMPHOMA OF AXILLARY LYMPH NODES: Primary | ICD-10-CM

## 2022-12-23 PROCEDURE — 96372 THER/PROPH/DIAG INJ SC/IM: CPT

## 2022-12-23 PROCEDURE — 63600175 PHARM REV CODE 636 W HCPCS: Mod: TB | Performed by: INTERNAL MEDICINE

## 2022-12-23 RX ADMIN — PEGFILGRASTIM-CBQV 6 MG: 6 INJECTION, SOLUTION SUBCUTANEOUS at 09:12

## 2023-01-05 ENCOUNTER — HOSPITAL ENCOUNTER (OUTPATIENT)
Dept: INTERVENTIONAL RADIOLOGY/VASCULAR | Facility: HOSPITAL | Age: 73
Discharge: HOME | End: 2023-01-05
Attending: INTERNAL MEDICINE
Payer: MEDICARE

## 2023-01-05 VITALS
OXYGEN SATURATION: 93 % | WEIGHT: 280 LBS | DIASTOLIC BLOOD PRESSURE: 69 MMHG | RESPIRATION RATE: 12 BRPM | HEIGHT: 73 IN | BODY MASS INDEX: 37.11 KG/M2 | TEMPERATURE: 98 F | SYSTOLIC BLOOD PRESSURE: 147 MMHG | HEART RATE: 72 BPM

## 2023-01-05 DIAGNOSIS — C84.44 PERIPHERAL T CELL LYMPHOMA OF AXILLARY LYMPH NODES: ICD-10-CM

## 2023-01-05 DIAGNOSIS — C85.90 T-CELL LYMPHOMA: ICD-10-CM

## 2023-01-05 LAB
ALBUMIN SERPL BCP-MCNC: 3.6 G/DL (ref 3.5–5.2)
ALP SERPL-CCNC: 83 U/L (ref 55–135)
ALT SERPL W/O P-5'-P-CCNC: 46 U/L (ref 10–44)
ANION GAP SERPL CALC-SCNC: 12 MMOL/L (ref 8–16)
AST SERPL-CCNC: 48 U/L (ref 10–40)
BILIRUB SERPL-MCNC: 0.5 MG/DL (ref 0.1–1)
BUN SERPL-MCNC: 10 MG/DL (ref 8–23)
CALCIUM SERPL-MCNC: 9.9 MG/DL (ref 8.7–10.5)
CHLORIDE SERPL-SCNC: 101 MMOL/L (ref 95–110)
CO2 SERPL-SCNC: 23 MMOL/L (ref 23–29)
CREAT SERPL-MCNC: 1 MG/DL (ref 0.5–1.4)
ERYTHROCYTE [DISTWIDTH] IN BLOOD BY AUTOMATED COUNT: 16.7 % (ref 11.5–14.5)
EST. GFR  (NO RACE VARIABLE): >60 ML/MIN/1.73 M^2
GLUCOSE SERPL-MCNC: 232 MG/DL (ref 70–110)
HCT VFR BLD AUTO: 33.1 % (ref 40–54)
HGB BLD-MCNC: 11.6 G/DL (ref 14–18)
INR PPP: 1.1 (ref 0.8–1.2)
MCH RBC QN AUTO: 35 PG (ref 27–31)
MCHC RBC AUTO-ENTMCNC: 35 G/DL (ref 32–36)
MCV RBC AUTO: 100 FL (ref 82–98)
PLATELET # BLD AUTO: 199 K/UL (ref 150–450)
PMV BLD AUTO: 11.4 FL (ref 9.2–12.9)
POTASSIUM SERPL-SCNC: 5 MMOL/L (ref 3.5–5.1)
PROT SERPL-MCNC: 7.2 G/DL (ref 6–8.4)
PROTHROMBIN TIME: 11 SEC (ref 9–12.5)
RBC # BLD AUTO: 3.31 M/UL (ref 4.6–6.2)
SODIUM SERPL-SCNC: 136 MMOL/L (ref 136–145)
WBC # BLD AUTO: 6.05 K/UL (ref 3.9–12.7)

## 2023-01-05 PROCEDURE — 25000003 PHARM REV CODE 250: Performed by: RADIOLOGY

## 2023-01-05 PROCEDURE — 99152 PR MOD CONSCIOUS SEDATION, SAME PHYS, 5+ YRS, FIRST 15 MIN: ICD-10-PCS | Mod: ,,, | Performed by: RADIOLOGY

## 2023-01-05 PROCEDURE — 63600175 PHARM REV CODE 636 W HCPCS: Performed by: RADIOLOGY

## 2023-01-05 PROCEDURE — 85027 COMPLETE CBC AUTOMATED: CPT | Performed by: RADIOLOGY

## 2023-01-05 PROCEDURE — 99152 MOD SED SAME PHYS/QHP 5/>YRS: CPT | Mod: ,,, | Performed by: RADIOLOGY

## 2023-01-05 PROCEDURE — 80053 COMPREHEN METABOLIC PANEL: CPT | Performed by: RADIOLOGY

## 2023-01-05 PROCEDURE — 76937 US GUIDE VASCULAR ACCESS: CPT | Mod: TC | Performed by: RADIOLOGY

## 2023-01-05 PROCEDURE — 99152 MOD SED SAME PHYS/QHP 5/>YRS: CPT

## 2023-01-05 PROCEDURE — A4550 SURGICAL TRAYS: HCPCS

## 2023-01-05 PROCEDURE — 99152 MOD SED SAME PHYS/QHP 5/>YRS: CPT | Performed by: RADIOLOGY

## 2023-01-05 PROCEDURE — 85610 PROTHROMBIN TIME: CPT | Performed by: RADIOLOGY

## 2023-01-05 PROCEDURE — 99153 MOD SED SAME PHYS/QHP EA: CPT | Performed by: RADIOLOGY

## 2023-01-05 PROCEDURE — 77001 FLUOROGUIDE FOR VEIN DEVICE: CPT | Mod: TC | Performed by: RADIOLOGY

## 2023-01-05 PROCEDURE — 36561 IR TUNNELED CATH PLACEMENT WITH PORT: ICD-10-PCS | Mod: RT,,, | Performed by: RADIOLOGY

## 2023-01-05 PROCEDURE — 76937 US GUIDE VASCULAR ACCESS: CPT | Mod: 26,,, | Performed by: RADIOLOGY

## 2023-01-05 PROCEDURE — 77001 CHG FLUOROGUIDE CNTRL VEN ACCESS,PLACE,REPLACE,REMOVE: ICD-10-PCS | Mod: 26,,, | Performed by: RADIOLOGY

## 2023-01-05 PROCEDURE — 77001 FLUOROGUIDE FOR VEIN DEVICE: CPT | Mod: 26,,, | Performed by: RADIOLOGY

## 2023-01-05 PROCEDURE — 76937 PR  US GUIDE, VASCULAR ACCESS: ICD-10-PCS | Mod: 26,,, | Performed by: RADIOLOGY

## 2023-01-05 PROCEDURE — 99153 MOD SED SAME PHYS/QHP EA: CPT

## 2023-01-05 PROCEDURE — 36561 INSERT TUNNELED CV CATH: CPT | Performed by: RADIOLOGY

## 2023-01-05 RX ORDER — MIDAZOLAM HYDROCHLORIDE 1 MG/ML
INJECTION INTRAMUSCULAR; INTRAVENOUS
Status: COMPLETED | OUTPATIENT
Start: 2023-01-05 | End: 2023-01-05

## 2023-01-05 RX ORDER — LIDOCAINE HYDROCHLORIDE 10 MG/ML
INJECTION INFILTRATION; PERINEURAL
Status: COMPLETED | OUTPATIENT
Start: 2023-01-05 | End: 2023-01-05

## 2023-01-05 RX ORDER — FENTANYL CITRATE 50 UG/ML
INJECTION, SOLUTION INTRAMUSCULAR; INTRAVENOUS
Status: COMPLETED | OUTPATIENT
Start: 2023-01-05 | End: 2023-01-05

## 2023-01-05 RX ORDER — HEPARIN SOD,PORCINE/0.9 % NACL 1000/500ML
INTRAVENOUS SOLUTION INTRAVENOUS
Status: COMPLETED | OUTPATIENT
Start: 2023-01-05 | End: 2023-01-05

## 2023-01-05 RX ORDER — CEFAZOLIN SODIUM 2 G/50ML
2 SOLUTION INTRAVENOUS ONCE
Status: COMPLETED | OUTPATIENT
Start: 2023-01-05 | End: 2023-01-05

## 2023-01-05 RX ORDER — SODIUM CHLORIDE 9 MG/ML
INJECTION, SOLUTION INTRAVENOUS
Status: COMPLETED | OUTPATIENT
Start: 2023-01-05 | End: 2023-01-05

## 2023-01-05 RX ORDER — HEPARIN 100 UNIT/ML
SYRINGE INTRAVENOUS
Status: COMPLETED | OUTPATIENT
Start: 2023-01-05 | End: 2023-01-05

## 2023-01-05 RX ADMIN — MIDAZOLAM HYDROCHLORIDE 1 MG: 1 INJECTION, SOLUTION INTRAMUSCULAR; INTRAVENOUS at 11:01

## 2023-01-05 RX ADMIN — LIDOCAINE HYDROCHLORIDE 10 ML: 10 INJECTION, SOLUTION INFILTRATION; PERINEURAL at 11:01

## 2023-01-05 RX ADMIN — HEPARIN 5 ML: 100 SYRINGE at 11:01

## 2023-01-05 RX ADMIN — Medication 500 ML: at 11:01

## 2023-01-05 RX ADMIN — CEFAZOLIN SODIUM 2 G: 2 SOLUTION INTRAVENOUS at 10:01

## 2023-01-05 RX ADMIN — FENTANYL CITRATE 50 MCG: 50 INJECTION, SOLUTION INTRAMUSCULAR; INTRAVENOUS at 11:01

## 2023-01-05 RX ADMIN — SODIUM CHLORIDE 500 ML: 0.9 INJECTION, SOLUTION INTRAVENOUS at 11:01

## 2023-01-05 NOTE — PROCEDURES
Interventional Radiology Immediate Post-Procedure Note    Pre-Op Diagnosis: Mycosis fungoides  Post-Op Diagnosis: Same    Procedure: Chest port placement    Procedure performed by: Harley Llamas MD  Assistants: None    Estimated Blood Loss: Minimal  Specimen Removed: None    Findings/description of procedure:  8-Fr BARD CLEARVUE chest port placed via patent RIGHT IJ vein. Tip near the superior cavoatrial jxn.    No immediate complications. Patient tolerated procedure well. Please see full dictated procedure report for additional details and recommendations.    Recommendations:  Port is ready for immediate use.      Harley Llamas MD  [facility]  Pager [phone]

## 2023-01-05 NOTE — DISCHARGE SUMMARY
Interventional Radiology Short Stay Discharge Summary      Admit Date: 1/5/2023  Discharge Date: 01/05/2023     Hospital Course: Uneventful    Discharge Diagnosis: Mycosis fungoides    Discharge Condition: Stable    Discharge Disposition: Home    Diet: Resume prior diet    Activity: Activity as tolerated and no driving for today    Follow-up: With referring provider      Harley Llamas MD  [facility]  Pager [phone]

## 2023-01-05 NOTE — PLAN OF CARE
Patient discharged home in stable condition. PIV removed with catheter tip intact. AVS provided and reviewed with patient. Patient brought to friends vehicle via wheel chair. IR care complete.   
Kiel Miranda (Attending)

## 2023-01-05 NOTE — H&P
Interventional Radiology Pre-Procedure History & Physical      Chief Complaint/Reason for Referral: Mycosis fungoides    History of Present Illness:  Randy Armstrong is a 72 y.o. male who presents with mycosis fungoides. Referred for port placement.    Past Medical History:   Diagnosis Date    Cancer      History reviewed. No pertinent surgical history.    Allergies:   Review of patient's allergies indicates:   Allergen Reactions    Iodinated contrast media Other (See Comments)     Pt states that he looks sunburnt afterwards    Vancomycin analogues Other (See Comments)     Red man syndrome        Home Meds:   Prior to Admission medications    Medication Sig Start Date End Date Taking? Authorizing Provider   apixaban (ELIQUIS) 5 mg Tab Take 5 mg by mouth. 10/28/22  Yes Historical Provider   allopurinoL (ZYLOPRIM) 300 MG tablet Take 300 mg by mouth. 11/16/22   Historical Provider   clobetasol 0.05% (TEMOVATE) 0.05 % Oint Apply topically. 7/4/22   Historical Provider   famotidine (PEPCID) 20 MG tablet Take 20 mg by mouth. 10/28/22   Historical Provider   lisinopriL 10 MG tablet Take 10 mg by mouth. 7/5/22   Historical Provider   mupirocin (BACTROBAN) 2 % ointment Apply topically. 7/4/22   Historical Provider   ondansetron (ZOFRAN) 8 MG tablet Take 8 mg by mouth. 10/28/22   Historical Provider   oxyCODONE (ROXICODONE) 15 MG Tab Take 7.5 mg by mouth. 10/28/22   Historical Provider   prochlorperazine (COMPAZINE) 10 MG tablet Take 10 mg by mouth. 10/28/22   Historical Provider   sulfamethoxazole-trimethoprim 800-160mg (BACTRIM DS) 800-160 mg Tab Take 1 tablet by mouth. 11/16/22   Historical Provider   triamcinolone acetonide 0.1% (KENALOG) 0.1 % cream Apply topically. 9/26/22   Historical Provider   valACYclovir (VALTREX) 500 MG tablet Take 500 mg by mouth. 10/28/22   Historical Provider       Anticoagulation/Antiplatelet Meds: Eliquis    Review of Systems:   Hematological: no known coagulopathies  Respiratory: no shortness  of breath  Cardiovascular: no chest pain  Gastrointestinal: no abdominal pain  Genitourinary: no dysuria  Musculoskeletal: negative  Neurological: no TIA or stroke symptoms     Physical Exam:  Temp: 97.8 °F (36.6 °C) (01/05/23 0901)  Pulse: 68 (01/05/23 1100)  Resp: 14 (01/05/23 1100)  BP: (!) 183/85 (01/05/23 1100)  SpO2: 97 % (01/05/23 1100)    General: NAD  HEENT: Normocephalic, sclera anicteric, oropharynx clear  Neck: Supple, no palpable lymphadenopathy  Heart: RRR  Lungs: Symmetric excursions, breathing unlabored  Abd: NTND, soft  Extremities: CEDENO  Neuro: Gross nonfocal    Laboratory:  Lab Results   Component Value Date    INR 1.1 01/05/2023       Lab Results   Component Value Date    WBC 6.05 01/05/2023    HGB 11.6 (L) 01/05/2023    HCT 33.1 (L) 01/05/2023     (H) 01/05/2023     01/05/2023      Lab Results   Component Value Date     (H) 01/05/2023     01/05/2023    K 5.0 01/05/2023     01/05/2023    CO2 23 01/05/2023    BUN 10 01/05/2023    CREATININE 1.0 01/05/2023    CALCIUM 9.9 01/05/2023    ALT 46 (H) 01/05/2023    AST 48 (H) 01/05/2023    ALBUMIN 3.6 01/05/2023    BILITOT 0.5 01/05/2023       Assessment/Plan:  72 y.o. male with mycosis fungoides. Will undergo chest port placement today.    Sedation:  Sedation history: have not been any systemic reactions  ASA: 3 / Mallampati: 3  Sedation plan: Up to moderate (Versed, fentanyl)     Risks (including, but not limited to, pain, bleeding, infection, damage to nearby structures, treatment failure/recurrence, and the need for additional procedures), potential benefits, and alternatives were discussed with the patient. All questions were answered to the best of my abilities. The patient wishes to proceed. Written informed consent was obtained.      Harley Llamas MD  [facility]  Pager [phone]

## 2023-01-12 ENCOUNTER — INFUSION (OUTPATIENT)
Dept: INFUSION THERAPY | Facility: HOSPITAL | Age: 73
End: 2023-01-12
Attending: INTERNAL MEDICINE
Payer: MEDICARE

## 2023-01-12 ENCOUNTER — OFFICE VISIT (OUTPATIENT)
Dept: HEMATOLOGY/ONCOLOGY | Facility: CLINIC | Age: 73
End: 2023-01-12
Payer: MEDICARE

## 2023-01-12 VITALS
BODY MASS INDEX: 35.91 KG/M2 | HEART RATE: 67 BPM | SYSTOLIC BLOOD PRESSURE: 191 MMHG | TEMPERATURE: 98 F | OXYGEN SATURATION: 98 % | DIASTOLIC BLOOD PRESSURE: 89 MMHG | WEIGHT: 272.19 LBS

## 2023-01-12 VITALS
HEIGHT: 73 IN | WEIGHT: 272.25 LBS | SYSTOLIC BLOOD PRESSURE: 170 MMHG | OXYGEN SATURATION: 100 % | RESPIRATION RATE: 18 BRPM | DIASTOLIC BLOOD PRESSURE: 61 MMHG | HEART RATE: 69 BPM | TEMPERATURE: 98 F | BODY MASS INDEX: 36.08 KG/M2

## 2023-01-12 DIAGNOSIS — C84.44 PERIPHERAL T CELL LYMPHOMA OF AXILLARY LYMPH NODES: Primary | ICD-10-CM

## 2023-01-12 DIAGNOSIS — I10 PRIMARY HYPERTENSION: ICD-10-CM

## 2023-01-12 DIAGNOSIS — M10.09 ACUTE IDIOPATHIC GOUT OF MULTIPLE SITES: ICD-10-CM

## 2023-01-12 DIAGNOSIS — C84.09 MYCOSIS FUNGOIDES INVOLVING EXTRANODAL SITE EXCLUDING SPLEEN AND OTHER SOLID ORGANS: Primary | ICD-10-CM

## 2023-01-12 DIAGNOSIS — C84.03: ICD-10-CM

## 2023-01-12 DIAGNOSIS — C84.09 MYCOSIS FUNGOIDES, EXTRANODAL AND SOLID ORGAN SITES: Primary | ICD-10-CM

## 2023-01-12 DIAGNOSIS — E11.9 TYPE 2 DIABETES MELLITUS WITHOUT COMPLICATION, WITHOUT LONG-TERM CURRENT USE OF INSULIN: ICD-10-CM

## 2023-01-12 PROCEDURE — 99215 OFFICE O/P EST HI 40 MIN: CPT | Mod: S$PBB,,, | Performed by: INTERNAL MEDICINE

## 2023-01-12 PROCEDURE — 99999 PR PBB SHADOW E&M-EST. PATIENT-LVL III: ICD-10-PCS | Mod: PBBFAC,,, | Performed by: INTERNAL MEDICINE

## 2023-01-12 PROCEDURE — 63600175 PHARM REV CODE 636 W HCPCS: Performed by: INTERNAL MEDICINE

## 2023-01-12 PROCEDURE — 96367 TX/PROPH/DG ADDL SEQ IV INF: CPT

## 2023-01-12 PROCEDURE — 96413 CHEMO IV INFUSION 1 HR: CPT

## 2023-01-12 PROCEDURE — 99215 PR OFFICE/OUTPT VISIT, EST, LEVL V, 40-54 MIN: ICD-10-PCS | Mod: S$PBB,,, | Performed by: INTERNAL MEDICINE

## 2023-01-12 PROCEDURE — 96417 CHEMO IV INFUS EACH ADDL SEQ: CPT

## 2023-01-12 PROCEDURE — 99213 OFFICE O/P EST LOW 20 MIN: CPT | Mod: PBBFAC,25 | Performed by: INTERNAL MEDICINE

## 2023-01-12 PROCEDURE — 99999 PR PBB SHADOW E&M-EST. PATIENT-LVL III: CPT | Mod: PBBFAC,,, | Performed by: INTERNAL MEDICINE

## 2023-01-12 PROCEDURE — 25000003 PHARM REV CODE 250: Performed by: INTERNAL MEDICINE

## 2023-01-12 PROCEDURE — A4216 STERILE WATER/SALINE, 10 ML: HCPCS | Performed by: INTERNAL MEDICINE

## 2023-01-12 PROCEDURE — 96411 CHEMO IV PUSH ADDL DRUG: CPT

## 2023-01-12 RX ORDER — HEPARIN 100 UNIT/ML
500 SYRINGE INTRAVENOUS
Status: CANCELLED | OUTPATIENT
Start: 2023-01-12

## 2023-01-12 RX ORDER — DOXORUBICIN HYDROCHLORIDE 2 MG/ML
50 INJECTION, SOLUTION INTRAVENOUS
Status: CANCELLED | OUTPATIENT
Start: 2023-01-12

## 2023-01-12 RX ORDER — DOXORUBICIN HYDROCHLORIDE 2 MG/ML
50 INJECTION, SOLUTION INTRAVENOUS
Status: COMPLETED | OUTPATIENT
Start: 2023-01-12 | End: 2023-01-12

## 2023-01-12 RX ORDER — HEPARIN 100 UNIT/ML
500 SYRINGE INTRAVENOUS
Status: DISCONTINUED | OUTPATIENT
Start: 2023-01-12 | End: 2023-01-12 | Stop reason: HOSPADM

## 2023-01-12 RX ORDER — SODIUM CHLORIDE 0.9 % (FLUSH) 0.9 %
10 SYRINGE (ML) INJECTION
Status: CANCELLED | OUTPATIENT
Start: 2023-01-12

## 2023-01-12 RX ORDER — SODIUM CHLORIDE 0.9 % (FLUSH) 0.9 %
10 SYRINGE (ML) INJECTION
Status: DISCONTINUED | OUTPATIENT
Start: 2023-01-12 | End: 2023-01-12 | Stop reason: HOSPADM

## 2023-01-12 RX ADMIN — BRENTUXIMAB VEDOTIN 180 MG: 50 INJECTION, POWDER, LYOPHILIZED, FOR SOLUTION INTRAVENOUS at 04:01

## 2023-01-12 RX ADMIN — DEXAMETHASONE SODIUM PHOSPHATE 0.25 MG: 4 INJECTION, SOLUTION INTRA-ARTICULAR; INTRALESIONAL; INTRAMUSCULAR; INTRAVENOUS; SOFT TISSUE at 01:01

## 2023-01-12 RX ADMIN — CYCLOPHOSPHAMIDE 1920 MG: 200 INJECTION, SOLUTION INTRAVENOUS at 02:01

## 2023-01-12 RX ADMIN — HEPARIN 500 UNITS: 100 SYRINGE at 04:01

## 2023-01-12 RX ADMIN — Medication 10 ML: at 04:01

## 2023-01-12 RX ADMIN — DOXORUBICIN HYDROCHLORIDE 128 MG: 2 INJECTION, SOLUTION INTRAVENOUS at 02:01

## 2023-01-12 NOTE — PLAN OF CARE
Pt received Doxorubicin, Cytoxan & Adcentris today and tolerated well, without complications. Educated patient about Doxorubicin, Cytoxan & Adcentris (indications, side effects, possible reactions, chemotherapy precautions) and verbalized understanding.  VSS. CW port positive for blood return, saline flushed, Heparin flush instilled to dwell and de accessed prior to DC. Pt DC with no distress noted, ambulated off unit, w/o event, via self, pleased.

## 2023-01-12 NOTE — PROGRESS NOTES
c  Hematology and Medical Oncology   Follow up     01/12/2023      Primary Hematologic Diagnosis: Mycosis Fungoides      History of Present Ilness:   Randy Armstrong (David) is a pleasant 72 y.o.male who presents today as a self referral. Has received the first 2 cycles of chemo at [facility] and recently relocated to [place] at least temporarily and would like to continue therapy. Currently residing with ex-wife and ex-sister in law.      Hematologic History of his Mycosis fungoides:  --2020: developed rash on legs, initially treated as ringworm with local physician  --9/16/21: Biopsy with local dermatologist with [facility], read at [facility] per below, initially treated with silvadene. Pt did not have insurance so no further work-up was performed.  --10/2021: Started developing tumors, did not see dermatologist again due to insurance issues, they sent rx for halobetasol and valchlor. He was not able to start valchlor due to cost.  --1/2022: Started tanning bed, tumors worsening    --PET on 9/26/22:  1. New and increased lymphadenopathy left axilla, right groin, and right popliteal fossa.  2. New F-18 FDG avid intramuscular mass in the right calf.   3. Increased size and avidity of the subcutaneous left gluteal cleft lesion.   4. Decreased size and avidity of cutaneous lesions at the left eyelid, left upper back, left arm, right lower abdominal wall, bilateral groin, and left foot.   5. Decreased/resolved mildly F-18 FDG avid left cervical lymph nodes.     10/4/2022 - 10/5/2022 TX-Radiation Therapy   Left inner thigh 8Gy in 2 fractions     Reportedly hospitalized the week of 10/24/22 for worsening skin infections and received CHP+ Brentuximab while inpatient.     C1D1: 10/28, C2D1:11/17    He continues to endorse ongoing open wounds to extremities, which he keeps covered with bandages for a week + at a time with a single bandage. + drainage from lesions.    Experienced generalized itching for several days after PET  scan    Interval History:  Mr. Armstrong is doing well. Tolerated the last chemo without significant issue. Experienced some taste bud changes. Left axillary lesion opened and started draining. No longer has areas of erythema.     Wounds continue to heal. Left leg lesion has a small open hole, but is not draining.No surrounding erythema or swelling      PAST MEDICAL HISTORY:     Diabetes  Gout  Hypertension    PAST SURGICAL HISTORY:   No past surgical history on file.    PAST SOCIAL HISTORY:   reports that he has never smoked. He has never used smokeless tobacco.    FAMILY HISTORY:  No family history on file.    CURRENT MEDICATIONS:   Current Outpatient Medications   Medication Sig    allopurinoL (ZYLOPRIM) 300 MG tablet Take 300 mg by mouth.    apixaban (ELIQUIS) 5 mg Tab Take 5 mg by mouth.    clobetasol 0.05% (TEMOVATE) 0.05 % Oint Apply topically.    famotidine (PEPCID) 20 MG tablet Take 20 mg by mouth.    lisinopriL 10 MG tablet Take 10 mg by mouth.    mupirocin (BACTROBAN) 2 % ointment Apply topically.    ondansetron (ZOFRAN) 8 MG tablet Take 8 mg by mouth.    oxyCODONE (ROXICODONE) 15 MG Tab Take 7.5 mg by mouth.    prochlorperazine (COMPAZINE) 10 MG tablet Take 10 mg by mouth.    sulfamethoxazole-trimethoprim 800-160mg (BACTRIM DS) 800-160 mg Tab Take 1 tablet by mouth.    triamcinolone acetonide 0.1% (KENALOG) 0.1 % cream Apply topically.    valACYclovir (VALTREX) 500 MG tablet Take 500 mg by mouth.     No current facility-administered medications for this visit.     ALLERGIES:   Review of patient's allergies indicates:   Allergen Reactions    Iodinated contrast media Other (See Comments)     Pt states that he looks sunburnt afterwards    Vancomycin analogues Other (See Comments)     Red man syndrome         Review of Systems:     Review of Systems   Constitutional:  Positive for fatigue. Negative for appetite change, chills, diaphoresis, fever and unexpected weight change.   HENT:   Negative for hearing loss,  mouth sores, nosebleeds, sore throat, trouble swallowing and voice change.    Eyes:  Negative for eye problems and icterus.   Respiratory:  Negative for chest tightness, cough, hemoptysis, shortness of breath and wheezing.    Cardiovascular:  Negative for chest pain, leg swelling and palpitations.   Gastrointestinal:  Negative for abdominal distention, abdominal pain, blood in stool, diarrhea, nausea and vomiting.   Endocrine: Negative for hot flashes.   Genitourinary:  Negative for bladder incontinence, difficulty urinating, dysuria and hematuria.    Musculoskeletal:  Negative for arthralgias, back pain, flank pain, gait problem, myalgias, neck pain and neck stiffness.   Skin:  Positive for itching and wound. Negative for rash.   Neurological:  Negative for dizziness, extremity weakness, gait problem, headaches, numbness, seizures and speech difficulty.   Hematological:  Negative for adenopathy. Does not bruise/bleed easily.   Psychiatric/Behavioral:  Negative for confusion, depression and sleep disturbance. The patient is not nervous/anxious.         Physical Exam:     Vitals:    01/12/23 1122   BP: (!) 191/89   Pulse: 67   Temp: 98.4 °F (36.9 °C)       Physical Exam  Constitutional:       General: He is not in acute distress.     Appearance: He is well-developed. He is not diaphoretic.   HENT:      Head: Normocephalic and atraumatic.      Mouth/Throat:      Pharynx: No oropharyngeal exudate.   Eyes:      Conjunctiva/sclera: Conjunctivae normal.      Pupils: Pupils are equal, round, and reactive to light.   Neck:      Thyroid: No thyromegaly.      Vascular: No JVD.      Trachea: No tracheal deviation.   Cardiovascular:      Rate and Rhythm: Normal rate and regular rhythm.      Heart sounds: Normal heart sounds. No murmur heard.    No friction rub.   Pulmonary:      Effort: Pulmonary effort is normal. No respiratory distress.      Breath sounds: Normal breath sounds. No stridor. No wheezing or rales.   Chest:       Chest wall: No tenderness.   Abdominal:      General: Bowel sounds are normal. There is no distension.      Palpations: Abdomen is soft.      Tenderness: There is no abdominal tenderness. There is no guarding or rebound.   Musculoskeletal:         General: No tenderness or deformity. Normal range of motion.      Cervical back: Normal range of motion and neck supple.      Comments: + axillary mass. Draining, soft no surrounding cellulitis   Skin:     General: Skin is warm and dry.      Capillary Refill: Capillary refill takes less than 2 seconds.      Coloration: Skin is not pale.      Findings: Lesion present. No erythema or rash.   Neurological:      Mental Status: He is alert and oriented to person, place, and time.      Cranial Nerves: No cranial nerve deficit.      Sensory: No sensory deficit.      Motor: No abnormal muscle tone.      Coordination: Coordination normal.      Deep Tendon Reflexes: Reflexes normal.   Psychiatric:         Behavior: Behavior normal.         Thought Content: Thought content normal.         Judgment: Judgment normal.       ECOG Performance Status: (foot note - ECOG PS provided by Eastern Cooperative Oncology Group) 0 - Asymptomatic    Karnofsky Performance Score:  100%- Normal, No Complaints, No Evidence of Disease    Labs:   Lab Results   Component Value Date    WBC 3.68 (L) 01/12/2023    HGB 11.2 (L) 01/12/2023    HCT 32.6 (L) 01/12/2023     01/12/2023    TRIG 329 (H) 10/19/2022    ALT 46 (H) 01/05/2023    AST 48 (H) 01/05/2023     01/05/2023    K 5.0 01/05/2023     01/05/2023    CREATININE 1.0 01/05/2023    BUN 10 01/05/2023    CO2 23 01/05/2023    INR 1.1 01/05/2023    HGBA1C 6.0 (H) 10/26/2022         Imaging: Previous imaging has been reviewed     Assessment and Plan:     Mr. Armstrong is a pleasant 72 year old male with  peripheral T cell lymphoma    Peripheral T Cell Lymphoma  --stage IIB ([id number])   --lymph node biopsy as PERIPHERAL T-CELL LYMPHOMA WITH  EXTENSIVE NECROSIS-  --Plan to continue CHP-BV for 2 more cycles [received first 2 cycles at [facility]]  --On triple prophylaxis from [facility]  --Okay to proceed with cycle 4 today  --PET prior to next cycle  --Advised to receive the remainder of therapy at the same facility    TLS  --Uric acid is elevated today, will resume allopurinol    DVT  --On eliquis    Hypertension  --Currently taking lisinopril  --Poorly controlled based on our reads  --Will add additional agent      30 minutes were spent face to face with the patient to discuss the disease, natural history, treatment options and survival statistics. I have provided the patient with an opportunity to ask questions and have all questions answered to his satisfaction.       he will return to clinic prior to cycle 5, but knows to call in the interim if symptoms change or should a problem arise.        Teodora Rashid MD  Hematology and Medical Oncology  Bone Marrow Transplant  [facility]      BMT Chart Routing      Follow up with physician . 1. PET on 1/31 in [place] 2.see me on 2/1 1pm with labs [okay that i'm on service] cbc,cmp,ldh, uric acid 3. CHP-BV on 2/2 and 2/23, nualasta on 2/3 and 2/24   Follow up with MICHELLE    Provider visit type    Infusion scheduling note    Injection scheduling note    Labs    Imaging    Pharmacy appointment    Other referrals

## 2023-01-13 ENCOUNTER — INFUSION (OUTPATIENT)
Dept: INFUSION THERAPY | Facility: HOSPITAL | Age: 73
End: 2023-01-13
Attending: INTERNAL MEDICINE
Payer: MEDICARE

## 2023-01-13 DIAGNOSIS — C84.44 PERIPHERAL T CELL LYMPHOMA OF AXILLARY LYMPH NODES: Primary | ICD-10-CM

## 2023-01-13 PROCEDURE — 96372 THER/PROPH/DIAG INJ SC/IM: CPT

## 2023-01-13 PROCEDURE — 63600175 PHARM REV CODE 636 W HCPCS: Mod: TB | Performed by: INTERNAL MEDICINE

## 2023-01-13 RX ORDER — LIDOCAINE AND PRILOCAINE 25; 25 MG/G; MG/G
CREAM TOPICAL
Qty: 30 G | Refills: 3 | Status: SHIPPED | OUTPATIENT
Start: 2023-01-13 | End: 2023-05-18

## 2023-01-13 RX ORDER — ALLOPURINOL 300 MG/1
300 TABLET ORAL 2 TIMES DAILY
Qty: 60 TABLET | Refills: 2 | Status: SHIPPED | OUTPATIENT
Start: 2023-01-13 | End: 2023-03-08

## 2023-01-13 RX ADMIN — PEGFILGRASTIM-CBQV 6 MG: 6 INJECTION, SOLUTION SUBCUTANEOUS at 01:01

## 2023-01-13 NOTE — NURSING
Pt here for Udenyca. Administered injection in abdomen. No questions or concerns. Pt ambulated out of unit unassisted.

## 2023-01-17 RX ORDER — LIDOCAINE AND PRILOCAINE 25; 25 MG/G; MG/G
CREAM TOPICAL
Qty: 30 G | Refills: 0 | Status: SHIPPED | OUTPATIENT
Start: 2023-01-17 | End: 2023-05-18

## 2023-01-20 ENCOUNTER — TELEPHONE (OUTPATIENT)
Dept: PHARMACY | Facility: CLINIC | Age: 73
End: 2023-01-20
Payer: MEDICARE

## 2023-01-23 ENCOUNTER — PATIENT MESSAGE (OUTPATIENT)
Dept: HEMATOLOGY/ONCOLOGY | Facility: CLINIC | Age: 73
End: 2023-01-23
Payer: MEDICARE

## 2023-01-23 DIAGNOSIS — D70.1 CHEMOTHERAPY-INDUCED NEUTROPENIA: Primary | ICD-10-CM

## 2023-01-23 DIAGNOSIS — T45.1X5A CHEMOTHERAPY-INDUCED NEUTROPENIA: Primary | ICD-10-CM

## 2023-01-23 RX ORDER — SULFAMETHOXAZOLE AND TRIMETHOPRIM 800; 160 MG/1; MG/1
1 TABLET ORAL DAILY
Qty: 30 TABLET | Refills: 3 | Status: ON HOLD | OUTPATIENT
Start: 2023-01-23 | End: 2023-05-20 | Stop reason: SDUPTHER

## 2023-01-27 ENCOUNTER — PATIENT MESSAGE (OUTPATIENT)
Dept: HEMATOLOGY/ONCOLOGY | Facility: CLINIC | Age: 73
End: 2023-01-27
Payer: MEDICARE

## 2023-01-27 DIAGNOSIS — B00.9 HSV (HERPES SIMPLEX VIRUS) INFECTION: Primary | ICD-10-CM

## 2023-01-27 RX ORDER — VALACYCLOVIR HYDROCHLORIDE 500 MG/1
500 TABLET, FILM COATED ORAL 2 TIMES DAILY
Qty: 60 TABLET | Refills: 3 | Status: SHIPPED | OUTPATIENT
Start: 2023-01-27

## 2023-01-31 ENCOUNTER — PATIENT MESSAGE (OUTPATIENT)
Dept: HEMATOLOGY/ONCOLOGY | Facility: CLINIC | Age: 73
End: 2023-01-31
Payer: MEDICARE

## 2023-02-01 ENCOUNTER — OFFICE VISIT (OUTPATIENT)
Dept: HEMATOLOGY/ONCOLOGY | Facility: CLINIC | Age: 73
End: 2023-02-01
Payer: MEDICARE

## 2023-02-01 ENCOUNTER — LAB VISIT (OUTPATIENT)
Dept: LAB | Facility: HOSPITAL | Age: 73
End: 2023-02-01
Attending: INTERNAL MEDICINE
Payer: MEDICARE

## 2023-02-01 VITALS
RESPIRATION RATE: 18 BRPM | WEIGHT: 263.75 LBS | HEIGHT: 73 IN | SYSTOLIC BLOOD PRESSURE: 151 MMHG | BODY MASS INDEX: 34.95 KG/M2 | OXYGEN SATURATION: 97 % | TEMPERATURE: 98 F | HEART RATE: 84 BPM | DIASTOLIC BLOOD PRESSURE: 68 MMHG

## 2023-02-01 DIAGNOSIS — T14.8XXD WOUND HEALING, DELAYED: ICD-10-CM

## 2023-02-01 DIAGNOSIS — E11.9 TYPE 2 DIABETES MELLITUS WITHOUT COMPLICATION, WITHOUT LONG-TERM CURRENT USE OF INSULIN: ICD-10-CM

## 2023-02-01 DIAGNOSIS — C84.44 PERIPHERAL T CELL LYMPHOMA OF AXILLARY LYMPH NODES: ICD-10-CM

## 2023-02-01 DIAGNOSIS — C84.02: Primary | ICD-10-CM

## 2023-02-01 LAB
ALBUMIN SERPL BCP-MCNC: 3.5 G/DL (ref 3.5–5.2)
ALP SERPL-CCNC: 71 U/L (ref 55–135)
ALT SERPL W/O P-5'-P-CCNC: 59 U/L (ref 10–44)
ANION GAP SERPL CALC-SCNC: 9 MMOL/L (ref 8–16)
AST SERPL-CCNC: 53 U/L (ref 10–40)
BASOPHILS # BLD AUTO: 0.05 K/UL (ref 0–0.2)
BASOPHILS NFR BLD: 1.4 % (ref 0–1.9)
BILIRUB SERPL-MCNC: 0.2 MG/DL (ref 0.1–1)
BUN SERPL-MCNC: 10 MG/DL (ref 8–23)
CALCIUM SERPL-MCNC: 9.8 MG/DL (ref 8.7–10.5)
CHLORIDE SERPL-SCNC: 106 MMOL/L (ref 95–110)
CO2 SERPL-SCNC: 23 MMOL/L (ref 23–29)
CREAT SERPL-MCNC: 1.2 MG/DL (ref 0.5–1.4)
DIFFERENTIAL METHOD: ABNORMAL
EOSINOPHIL # BLD AUTO: 0 K/UL (ref 0–0.5)
EOSINOPHIL NFR BLD: 0 % (ref 0–8)
ERYTHROCYTE [DISTWIDTH] IN BLOOD BY AUTOMATED COUNT: 15 % (ref 11.5–14.5)
EST. GFR  (NO RACE VARIABLE): >60 ML/MIN/1.73 M^2
GLUCOSE SERPL-MCNC: 193 MG/DL (ref 70–110)
HCT VFR BLD AUTO: 31.6 % (ref 40–54)
HGB BLD-MCNC: 10.5 G/DL (ref 14–18)
IMM GRANULOCYTES # BLD AUTO: 0.04 K/UL (ref 0–0.04)
IMM GRANULOCYTES NFR BLD AUTO: 1.1 % (ref 0–0.5)
LDH SERPL L TO P-CCNC: 184 U/L (ref 110–260)
LYMPHOCYTES # BLD AUTO: 0.6 K/UL (ref 1–4.8)
LYMPHOCYTES NFR BLD: 16.2 % (ref 18–48)
MCH RBC QN AUTO: 35.2 PG (ref 27–31)
MCHC RBC AUTO-ENTMCNC: 33.2 G/DL (ref 32–36)
MCV RBC AUTO: 106 FL (ref 82–98)
MONOCYTES # BLD AUTO: 0.5 K/UL (ref 0.3–1)
MONOCYTES NFR BLD: 13.2 % (ref 4–15)
NEUTROPHILS # BLD AUTO: 2.5 K/UL (ref 1.8–7.7)
NEUTROPHILS NFR BLD: 68.1 % (ref 38–73)
NRBC BLD-RTO: 0 /100 WBC
PHOSPHATE SERPL-MCNC: 3.6 MG/DL (ref 2.7–4.5)
PLATELET # BLD AUTO: 277 K/UL (ref 150–450)
PMV BLD AUTO: 9.7 FL (ref 9.2–12.9)
POTASSIUM SERPL-SCNC: 4.9 MMOL/L (ref 3.5–5.1)
PROT SERPL-MCNC: 6.8 G/DL (ref 6–8.4)
RBC # BLD AUTO: 2.98 M/UL (ref 4.6–6.2)
SODIUM SERPL-SCNC: 138 MMOL/L (ref 136–145)
URATE SERPL-MCNC: 3.2 MG/DL (ref 3.4–7)
WBC # BLD AUTO: 3.7 K/UL (ref 3.9–12.7)

## 2023-02-01 PROCEDURE — 99999 PR PBB SHADOW E&M-EST. PATIENT-LVL III: CPT | Mod: PBBFAC,,, | Performed by: INTERNAL MEDICINE

## 2023-02-01 PROCEDURE — 99215 PR OFFICE/OUTPT VISIT, EST, LEVL V, 40-54 MIN: ICD-10-PCS | Mod: S$PBB,,, | Performed by: INTERNAL MEDICINE

## 2023-02-01 PROCEDURE — 84550 ASSAY OF BLOOD/URIC ACID: CPT | Performed by: INTERNAL MEDICINE

## 2023-02-01 PROCEDURE — 36415 COLL VENOUS BLD VENIPUNCTURE: CPT | Performed by: INTERNAL MEDICINE

## 2023-02-01 PROCEDURE — 99213 OFFICE O/P EST LOW 20 MIN: CPT | Mod: PBBFAC | Performed by: INTERNAL MEDICINE

## 2023-02-01 PROCEDURE — 99215 OFFICE O/P EST HI 40 MIN: CPT | Mod: S$PBB,,, | Performed by: INTERNAL MEDICINE

## 2023-02-01 PROCEDURE — 85025 COMPLETE CBC W/AUTO DIFF WBC: CPT | Performed by: INTERNAL MEDICINE

## 2023-02-01 PROCEDURE — 83615 LACTATE (LD) (LDH) ENZYME: CPT | Performed by: INTERNAL MEDICINE

## 2023-02-01 PROCEDURE — 99999 PR PBB SHADOW E&M-EST. PATIENT-LVL III: ICD-10-PCS | Mod: PBBFAC,,, | Performed by: INTERNAL MEDICINE

## 2023-02-01 PROCEDURE — 80053 COMPREHEN METABOLIC PANEL: CPT | Performed by: INTERNAL MEDICINE

## 2023-02-01 PROCEDURE — 84100 ASSAY OF PHOSPHORUS: CPT | Performed by: INTERNAL MEDICINE

## 2023-02-02 ENCOUNTER — PATIENT MESSAGE (OUTPATIENT)
Dept: HEMATOLOGY/ONCOLOGY | Facility: CLINIC | Age: 73
End: 2023-02-02
Payer: MEDICARE

## 2023-02-02 ENCOUNTER — INFUSION (OUTPATIENT)
Dept: INFUSION THERAPY | Facility: HOSPITAL | Age: 73
End: 2023-02-02
Payer: MEDICARE

## 2023-02-02 VITALS
HEART RATE: 85 BPM | RESPIRATION RATE: 18 BRPM | SYSTOLIC BLOOD PRESSURE: 143 MMHG | DIASTOLIC BLOOD PRESSURE: 62 MMHG | HEIGHT: 73 IN | TEMPERATURE: 98 F | WEIGHT: 263.75 LBS | BODY MASS INDEX: 34.95 KG/M2

## 2023-02-02 DIAGNOSIS — C84.44 PERIPHERAL T CELL LYMPHOMA OF AXILLARY LYMPH NODES: Primary | ICD-10-CM

## 2023-02-02 PROCEDURE — A4216 STERILE WATER/SALINE, 10 ML: HCPCS | Performed by: INTERNAL MEDICINE

## 2023-02-02 PROCEDURE — 96411 CHEMO IV PUSH ADDL DRUG: CPT

## 2023-02-02 PROCEDURE — 25000003 PHARM REV CODE 250: Performed by: INTERNAL MEDICINE

## 2023-02-02 PROCEDURE — 96367 TX/PROPH/DG ADDL SEQ IV INF: CPT

## 2023-02-02 PROCEDURE — 63600175 PHARM REV CODE 636 W HCPCS: Performed by: INTERNAL MEDICINE

## 2023-02-02 PROCEDURE — 96413 CHEMO IV INFUSION 1 HR: CPT

## 2023-02-02 PROCEDURE — 96417 CHEMO IV INFUS EACH ADDL SEQ: CPT

## 2023-02-02 RX ORDER — HEPARIN 100 UNIT/ML
500 SYRINGE INTRAVENOUS
Status: DISCONTINUED | OUTPATIENT
Start: 2023-02-02 | End: 2023-02-02 | Stop reason: HOSPADM

## 2023-02-02 RX ORDER — SODIUM CHLORIDE 0.9 % (FLUSH) 0.9 %
10 SYRINGE (ML) INJECTION
Status: CANCELLED | OUTPATIENT
Start: 2023-02-02

## 2023-02-02 RX ORDER — HEPARIN 100 UNIT/ML
500 SYRINGE INTRAVENOUS
Status: CANCELLED | OUTPATIENT
Start: 2023-02-02

## 2023-02-02 RX ORDER — SODIUM CHLORIDE 0.9 % (FLUSH) 0.9 %
10 SYRINGE (ML) INJECTION
Status: DISCONTINUED | OUTPATIENT
Start: 2023-02-02 | End: 2023-02-02 | Stop reason: HOSPADM

## 2023-02-02 RX ORDER — DOXORUBICIN HYDROCHLORIDE 2 MG/ML
50 INJECTION, SOLUTION INTRAVENOUS
Status: CANCELLED | OUTPATIENT
Start: 2023-02-02

## 2023-02-02 RX ORDER — DOXORUBICIN HYDROCHLORIDE 2 MG/ML
50 INJECTION, SOLUTION INTRAVENOUS
Status: COMPLETED | OUTPATIENT
Start: 2023-02-02 | End: 2023-02-02

## 2023-02-02 RX ADMIN — Medication 10 ML: at 05:02

## 2023-02-02 RX ADMIN — BRENTUXIMAB VEDOTIN 180 MG: 50 INJECTION, POWDER, LYOPHILIZED, FOR SOLUTION INTRAVENOUS at 05:02

## 2023-02-02 RX ADMIN — DEXAMETHASONE SODIUM PHOSPHATE 0.25 MG: 4 INJECTION, SOLUTION INTRA-ARTICULAR; INTRALESIONAL; INTRAMUSCULAR; INTRAVENOUS; SOFT TISSUE at 02:02

## 2023-02-02 RX ADMIN — HEPARIN SODIUM (PORCINE) LOCK FLUSH IV SOLN 100 UNIT/ML 500 UNITS: 100 SOLUTION at 05:02

## 2023-02-02 RX ADMIN — SODIUM CHLORIDE: 0.9 INJECTION, SOLUTION INTRAVENOUS at 02:02

## 2023-02-02 RX ADMIN — DOXORUBICIN HYDROCHLORIDE 128 MG: 2 INJECTION, SOLUTION INTRAVENOUS at 03:02

## 2023-02-02 RX ADMIN — CYCLOPHOSPHAMIDE 1920 MG: 200 INJECTION, SOLUTION INTRAVENOUS at 04:02

## 2023-02-02 NOTE — PROGRESS NOTES
c  Hematology and Medical Oncology   Follow up     02/01/2023      Primary Hematologic Diagnosis: Mycosis Fungoides      History of Present Ilness:   Randy Armstrong (David) is a pleasant 72 y.o.male who presents today as a self referral. Has received the first 2 cycles of chemo at [facility] and recently relocated to [place] at least temporarily and would like to continue therapy. Currently residing with ex-wife and ex-sister in law.      Hematologic History of his Mycosis fungoides:  --2020: developed rash on legs, initially treated as ringworm with local physician  --9/16/21: Biopsy with local dermatologist with [facility], read at [facility] per below, initially treated with silvadene. Pt did not have insurance so no further work-up was performed.  --10/2021: Started developing tumors, did not see dermatologist again due to insurance issues, they sent rx for halobetasol and valchlor. He was not able to start valchlor due to cost.  --1/2022: Started tanning bed, tumors worsening    --PET on 9/26/22:  1. New and increased lymphadenopathy left axilla, right groin, and right popliteal fossa.  2. New F-18 FDG avid intramuscular mass in the right calf.   3. Increased size and avidity of the subcutaneous left gluteal cleft lesion.   4. Decreased size and avidity of cutaneous lesions at the left eyelid, left upper back, left arm, right lower abdominal wall, bilateral groin, and left foot.   5. Decreased/resolved mildly F-18 FDG avid left cervical lymph nodes.     10/4/2022 - 10/5/2022 [place]-Radiation Therapy   Left inner thigh 8Gy in 2 fractions     Reportedly hospitalized the week of 10/24/22 for worsening skin infections and received CHP+ Brentuximab while inpatient.     C1D1: 10/28, C2D1:11/17    He continues to endorse ongoing open wounds to extremities, which he keeps covered with bandages for a week + at a time with a single bandage. + drainage from lesions.    Experienced generalized itching for several days after PET  scan    Interval History:  Mr. Armstrong is doing well. Tolerated the last chemo without significant issue. Experienced some taste bud changes.     Not able to get the mid treatment PET due to elevated blood glucose.He had not eaten that day,but he feels his blood sugar is always elevated in the mornings and then normalizes by mid day.      Wounds continue to heal. Left leg lesion has a small open hole, but is not draining.No surrounding erythema or swelling      PAST MEDICAL HISTORY:     Diabetes  Gout  Hypertension    PAST SURGICAL HISTORY:   No past surgical history on file.    PAST SOCIAL HISTORY:   reports that he has never smoked. He has never used smokeless tobacco.    FAMILY HISTORY:  No family history on file.    CURRENT MEDICATIONS:   Current Outpatient Medications   Medication Sig    allopurinoL (ZYLOPRIM) 300 MG tablet Take 1 tablet (300 mg total) by mouth 2 (two) times daily.    apixaban (ELIQUIS) 5 mg Tab Take 5 mg by mouth.    clobetasol 0.05% (TEMOVATE) 0.05 % Oint Apply topically.    famotidine (PEPCID) 20 MG tablet Take 20 mg by mouth.    LIDOcaine-prilocaine (EMLA) cream Apply topically as needed (30 minutes prior to port being accessed).    LIDOcaine-prilocaine (EMLA) cream Apply to port site 30 minutes prior to charlie    lisinopriL 10 MG tablet Take 10 mg by mouth.    mupirocin (BACTROBAN) 2 % ointment Apply topically.    ondansetron (ZOFRAN) 8 MG tablet Take 8 mg by mouth.    oxyCODONE (ROXICODONE) 15 MG Tab Take 7.5 mg by mouth.    prochlorperazine (COMPAZINE) 10 MG tablet Take 10 mg by mouth.    sulfamethoxazole-trimethoprim 800-160mg (BACTRIM DS) 800-160 mg Tab Take 1 tablet by mouth once daily.    triamcinolone acetonide 0.1% (KENALOG) 0.1 % cream Apply topically.    valACYclovir (VALTREX) 500 MG tablet Take 1 tablet (500 mg total) by mouth 2 (two) times daily.     No current facility-administered medications for this visit.     ALLERGIES:   Review of patient's allergies indicates:   Allergen  Reactions    Iodinated contrast media Other (See Comments)     Pt states that he looks sunburnt afterwards    Vancomycin analogues Other (See Comments)     Red man syndrome         Review of Systems:     Review of Systems   Constitutional:  Positive for fatigue. Negative for appetite change, chills, diaphoresis, fever and unexpected weight change.   HENT:   Negative for hearing loss, mouth sores, nosebleeds, sore throat, trouble swallowing and voice change.    Eyes:  Negative for eye problems and icterus.   Respiratory:  Negative for chest tightness, cough, hemoptysis, shortness of breath and wheezing.    Cardiovascular:  Negative for chest pain, leg swelling and palpitations.   Gastrointestinal:  Negative for abdominal distention, abdominal pain, blood in stool, diarrhea, nausea and vomiting.   Endocrine: Negative for hot flashes.   Genitourinary:  Negative for bladder incontinence, difficulty urinating, dysuria and hematuria.    Musculoskeletal:  Negative for arthralgias, back pain, flank pain, gait problem, myalgias, neck pain and neck stiffness.   Skin:  Positive for itching and wound. Negative for rash.   Neurological:  Negative for dizziness, extremity weakness, gait problem, headaches, numbness, seizures and speech difficulty.   Hematological:  Negative for adenopathy. Does not bruise/bleed easily.   Psychiatric/Behavioral:  Negative for confusion, depression and sleep disturbance. The patient is not nervous/anxious.         Physical Exam:     Vitals:    02/01/23 1304   BP: (!) 151/68   Pulse: 84   Resp: 18   Temp: 98.4 °F (36.9 °C)       Physical Exam  Constitutional:       General: He is not in acute distress.     Appearance: He is well-developed. He is not diaphoretic.   HENT:      Head: Normocephalic and atraumatic.      Mouth/Throat:      Pharynx: No oropharyngeal exudate.   Eyes:      Conjunctiva/sclera: Conjunctivae normal.      Pupils: Pupils are equal, round, and reactive to light.   Neck:       Thyroid: No thyromegaly.      Vascular: No JVD.      Trachea: No tracheal deviation.   Cardiovascular:      Rate and Rhythm: Normal rate and regular rhythm.      Heart sounds: Normal heart sounds. No murmur heard.    No friction rub.   Pulmonary:      Effort: Pulmonary effort is normal. No respiratory distress.      Breath sounds: Normal breath sounds. No stridor. No wheezing or rales.   Chest:      Chest wall: No tenderness.   Abdominal:      General: Bowel sounds are normal. There is no distension.      Palpations: Abdomen is soft.      Tenderness: There is no abdominal tenderness. There is no guarding or rebound.   Musculoskeletal:         General: No tenderness or deformity. Normal range of motion.      Cervical back: Normal range of motion and neck supple.      Comments: + axillary mass.    Skin:     General: Skin is warm and dry.      Capillary Refill: Capillary refill takes less than 2 seconds.      Coloration: Skin is not pale.      Findings: No erythema, lesion or rash.   Neurological:      Mental Status: He is alert and oriented to person, place, and time.      Cranial Nerves: No cranial nerve deficit.      Sensory: No sensory deficit.      Motor: No abnormal muscle tone.      Coordination: Coordination normal.      Deep Tendon Reflexes: Reflexes normal.   Psychiatric:         Behavior: Behavior normal.         Thought Content: Thought content normal.         Judgment: Judgment normal.       ECOG Performance Status: (foot note - ECOG PS provided by Eastern Cooperative Oncology Group) 0 - Asymptomatic    Karnofsky Performance Score:  100%- Normal, No Complaints, No Evidence of Disease    Labs:   Lab Results   Component Value Date    WBC 3.70 (L) 02/01/2023    HGB 10.5 (L) 02/01/2023    HCT 31.6 (L) 02/01/2023     02/01/2023    TRIG 329 (H) 10/19/2022    ALT 59 (H) 02/01/2023    AST 53 (H) 02/01/2023     02/01/2023    K 4.9 02/01/2023     02/01/2023    CREATININE 1.2 02/01/2023    BUN 10  02/01/2023    CO2 23 02/01/2023    INR 1.1 01/05/2023    HGBA1C 6.0 (H) 10/26/2022         Imaging: Previous imaging has been reviewed     Assessment and Plan:     Mr. Armstrong is a pleasant 72 year old male with  peripheral T cell lymphoma    Peripheral T Cell Lymphoma  --stage IIB ([id number])   --lymph node biopsy as PERIPHERAL T-CELL LYMPHOMA WITH EXTENSIVE NECROSIS-  --Plan to continue CHP-BV for 1 more cycles [received first 2 cycles at [facility]]  --On triple prophylaxis from [facility]  --Okay to proceed with cycle 5 today  --attempted to do mid treatment PET prior to this cycle, but elevated glucoses prevented the scan from taking place. Will try again next week. Mid day at his request.  --Advised to receive the remainder of therapy at the same facility    TLS  --Uric acid improved today     DVT  --On eliquis    Hypertension  --Currently taking lisinopril  --Poorly controlled based on our reads  --Will add additional agent      30 minutes were spent face to face with the patient to discuss the disease, natural history, treatment options and survival statistics. I have provided the patient with an opportunity to ask questions and have all questions answered to his satisfaction.       he will return to clinic prior to cycle 6, but knows to call in the interim if symptoms change or should a problem arise.        Teodora Rashid MD  Hematology and Medical Oncology  Bone Marrow Transplant  [facility]      BMT Chart Routing  Urgent    Follow up with physician Other. 1. reschedule PET to week of 2/6 at [place] [he is requesting around noon] 2. see me 1pm on 2/23 [okay that i'm on service] with labs: cbc,cmp, ldh,uric acid 3. chemo brentux,cyclophosphamide, doxorubicin after appt on 2/23 4. neulasta on 2/24   Follow up with MICHELLE    Provider visit type    Infusion scheduling note    Injection scheduling note    Labs    Imaging    Pharmacy appointment    Other referrals

## 2023-02-02 NOTE — PLAN OF CARE
1947  Infusion completed, pt tolerated; pt instructed to increase water hydration daily; discussed when to contact [facility], when to report to ER; pt verbalized understanding of all discussed and when to report next

## 2023-02-02 NOTE — NURSING
1410  Pt here for Adriamycin, Cytoxan, Adcetris infusion, no new complaints or concerns, reports tolerating treatment; discussed treatment plan for today, all questions answered and pt agrees to proceed

## 2023-02-03 ENCOUNTER — INFUSION (OUTPATIENT)
Dept: INFUSION THERAPY | Facility: HOSPITAL | Age: 73
End: 2023-02-03
Payer: MEDICARE

## 2023-02-03 DIAGNOSIS — C84.44 PERIPHERAL T CELL LYMPHOMA OF AXILLARY LYMPH NODES: Primary | ICD-10-CM

## 2023-02-03 PROCEDURE — 96372 THER/PROPH/DIAG INJ SC/IM: CPT

## 2023-02-03 PROCEDURE — 63600175 PHARM REV CODE 636 W HCPCS: Mod: TB | Performed by: INTERNAL MEDICINE

## 2023-02-03 RX ADMIN — PEGFILGRASTIM-CBQV 6 MG: 6 INJECTION, SOLUTION SUBCUTANEOUS at 01:02

## 2023-02-03 NOTE — NURSING
Pt here for Udenyca injection. Administered injection in abdominal tissue. No questions or concerns. Pt ambulated out of unit unassisted.

## 2023-02-07 ENCOUNTER — PATIENT MESSAGE (OUTPATIENT)
Dept: HEMATOLOGY/ONCOLOGY | Facility: CLINIC | Age: 73
End: 2023-02-07
Payer: MEDICARE

## 2023-02-23 ENCOUNTER — OFFICE VISIT (OUTPATIENT)
Dept: HEMATOLOGY/ONCOLOGY | Facility: CLINIC | Age: 73
End: 2023-02-23
Payer: MEDICARE

## 2023-02-23 ENCOUNTER — INFUSION (OUTPATIENT)
Dept: INFUSION THERAPY | Facility: HOSPITAL | Age: 73
End: 2023-02-23
Payer: MEDICARE

## 2023-02-23 VITALS
SYSTOLIC BLOOD PRESSURE: 175 MMHG | HEART RATE: 96 BPM | BODY MASS INDEX: 33.7 KG/M2 | HEIGHT: 73 IN | RESPIRATION RATE: 17 BRPM | TEMPERATURE: 98 F | DIASTOLIC BLOOD PRESSURE: 82 MMHG | WEIGHT: 254.31 LBS | OXYGEN SATURATION: 97 %

## 2023-02-23 VITALS
DIASTOLIC BLOOD PRESSURE: 67 MMHG | WEIGHT: 254.31 LBS | TEMPERATURE: 98 F | SYSTOLIC BLOOD PRESSURE: 158 MMHG | HEART RATE: 100 BPM | BODY MASS INDEX: 33.7 KG/M2 | HEIGHT: 73 IN | RESPIRATION RATE: 18 BRPM

## 2023-02-23 DIAGNOSIS — C84.09 MYCOSIS FUNGOIDES, EXTRANODAL AND SOLID ORGAN SITES: Primary | ICD-10-CM

## 2023-02-23 DIAGNOSIS — C84.44 PERIPHERAL T CELL LYMPHOMA OF AXILLARY LYMPH NODES: Primary | ICD-10-CM

## 2023-02-23 PROCEDURE — 99999 PR PBB SHADOW E&M-EST. PATIENT-LVL III: CPT | Mod: PBBFAC,,, | Performed by: INTERNAL MEDICINE

## 2023-02-23 PROCEDURE — 99215 OFFICE O/P EST HI 40 MIN: CPT | Mod: S$PBB,,, | Performed by: INTERNAL MEDICINE

## 2023-02-23 PROCEDURE — 96411 CHEMO IV PUSH ADDL DRUG: CPT

## 2023-02-23 PROCEDURE — 96417 CHEMO IV INFUS EACH ADDL SEQ: CPT

## 2023-02-23 PROCEDURE — 99999 PR PBB SHADOW E&M-EST. PATIENT-LVL III: ICD-10-PCS | Mod: PBBFAC,,, | Performed by: INTERNAL MEDICINE

## 2023-02-23 PROCEDURE — 25000003 PHARM REV CODE 250: Performed by: INTERNAL MEDICINE

## 2023-02-23 PROCEDURE — 99215 PR OFFICE/OUTPT VISIT, EST, LEVL V, 40-54 MIN: ICD-10-PCS | Mod: S$PBB,,, | Performed by: INTERNAL MEDICINE

## 2023-02-23 PROCEDURE — 96367 TX/PROPH/DG ADDL SEQ IV INF: CPT

## 2023-02-23 PROCEDURE — 99213 OFFICE O/P EST LOW 20 MIN: CPT | Mod: PBBFAC | Performed by: INTERNAL MEDICINE

## 2023-02-23 PROCEDURE — 63600175 PHARM REV CODE 636 W HCPCS: Mod: JG | Performed by: INTERNAL MEDICINE

## 2023-02-23 PROCEDURE — A4216 STERILE WATER/SALINE, 10 ML: HCPCS | Performed by: INTERNAL MEDICINE

## 2023-02-23 RX ORDER — DOXORUBICIN HYDROCHLORIDE 2 MG/ML
50 INJECTION, SOLUTION INTRAVENOUS
Status: COMPLETED | OUTPATIENT
Start: 2023-02-23 | End: 2023-02-23

## 2023-02-23 RX ORDER — HEPARIN 100 UNIT/ML
500 SYRINGE INTRAVENOUS
Status: DISCONTINUED | OUTPATIENT
Start: 2023-02-23 | End: 2023-02-23 | Stop reason: HOSPADM

## 2023-02-23 RX ORDER — HEPARIN 100 UNIT/ML
500 SYRINGE INTRAVENOUS
Status: CANCELLED | OUTPATIENT
Start: 2023-02-23

## 2023-02-23 RX ORDER — SODIUM CHLORIDE 0.9 % (FLUSH) 0.9 %
10 SYRINGE (ML) INJECTION
Status: DISCONTINUED | OUTPATIENT
Start: 2023-02-23 | End: 2023-02-23 | Stop reason: HOSPADM

## 2023-02-23 RX ORDER — SODIUM CHLORIDE 0.9 % (FLUSH) 0.9 %
10 SYRINGE (ML) INJECTION
Status: CANCELLED | OUTPATIENT
Start: 2023-02-23

## 2023-02-23 RX ORDER — DOXORUBICIN HYDROCHLORIDE 2 MG/ML
50 INJECTION, SOLUTION INTRAVENOUS
Status: CANCELLED | OUTPATIENT
Start: 2023-02-23

## 2023-02-23 RX ADMIN — CYCLOPHOSPHAMIDE 1920 MG: 200 INJECTION, SOLUTION INTRAVENOUS at 02:02

## 2023-02-23 RX ADMIN — Medication 10 ML: at 04:02

## 2023-02-23 RX ADMIN — BRENTUXIMAB VEDOTIN 180 MG: 50 INJECTION, POWDER, LYOPHILIZED, FOR SOLUTION INTRAVENOUS at 03:02

## 2023-02-23 RX ADMIN — DEXAMETHASONE SODIUM PHOSPHATE 0.25 MG: 4 INJECTION, SOLUTION INTRA-ARTICULAR; INTRALESIONAL; INTRAMUSCULAR; INTRAVENOUS; SOFT TISSUE at 01:02

## 2023-02-23 RX ADMIN — DOXORUBICIN HYDROCHLORIDE 128 MG: 2 INJECTION, SOLUTION INTRAVENOUS at 02:02

## 2023-02-23 RX ADMIN — HEPARIN 500 UNITS: 100 SYRINGE at 04:02

## 2023-02-23 NOTE — PLAN OF CARE
Patient tolerated Doxorubicin, Cytoxan, and Adcetris with no complications. VSS. Pt instructed to call [facility] with any problems. Pt discharged home independently.

## 2023-02-23 NOTE — PROGRESS NOTES
c  Hematology and Medical Oncology   Follow up     02/23/2023      Primary Hematologic Diagnosis: Mycosis Fungoides      History of Present Ilness:   Randy Armstrong (David) is a pleasant 72 y.o.male who presents today as a self referral. Has received the first 2 cycles of chemo at [facility] and recently relocated to [place] at least temporarily and would like to continue therapy. Currently residing with ex-wife and ex-sister in law.      Hematologic History of his Mycosis fungoides:  --2020: developed rash on legs, initially treated as ringworm with local physician  --9/16/21: Biopsy with local dermatologist with [facility], read at [facility] per below, initially treated with silvadene. Pt did not have insurance so no further work-up was performed.  --10/2021: Started developing tumors, did not see dermatologist again due to insurance issues, they sent rx for halobetasol and valchlor. He was not able to start valchlor due to cost.  --1/2022: Started tanning bed, tumors worsening    --PET on 9/26/22:  1. New and increased lymphadenopathy left axilla, right groin, and right popliteal fossa.  2. New F-18 FDG avid intramuscular mass in the right calf.   3. Increased size and avidity of the subcutaneous left gluteal cleft lesion.   4. Decreased size and avidity of cutaneous lesions at the left eyelid, left upper back, left arm, right lower abdominal wall, bilateral groin, and left foot.   5. Decreased/resolved mildly F-18 FDG avid left cervical lymph nodes.     10/4/2022 - 10/5/2022 [place]-Radiation Therapy   Left inner thigh 8Gy in 2 fractions     Reportedly hospitalized the week of 10/24/22 for worsening skin infections and received CHP+ Brentuximab while inpatient.     C1D1: 10/28, C2D1:11/17    He continues to endorse ongoing open wounds to extremities, which he keeps covered with bandages for a week + at a time with a single bandage. + drainage from lesions.    Experienced generalized itching for several days after PET  scan    Interval History:  Mr. Armstrong is doing well. Tolerated the last chemo without significant issue. Experienced some taste bud changes. Feels that he cannot taste almost any food. Therefor he doesn't try to eat.     Not able to get the mid treatment PET prior to this cycle either  due to elevated blood glucose.He had not eaten that day,but he feels his blood sugar is always elevated in the mornings and then normalizes by mid day.      Wounds continue to heal. Feels that the axilla still needs xrt.       PAST MEDICAL HISTORY:     Diabetes  Gout  Hypertension    PAST SURGICAL HISTORY:   No past surgical history on file.    PAST SOCIAL HISTORY:   reports that he has never smoked. He has never used smokeless tobacco.    FAMILY HISTORY:  No family history on file.    CURRENT MEDICATIONS:   Current Outpatient Medications   Medication Sig    allopurinoL (ZYLOPRIM) 300 MG tablet Take 1 tablet (300 mg total) by mouth 2 (two) times daily.    apixaban (ELIQUIS) 5 mg Tab Take 5 mg by mouth.    clobetasol 0.05% (TEMOVATE) 0.05 % Oint Apply topically.    famotidine (PEPCID) 20 MG tablet Take 20 mg by mouth.    LIDOcaine-prilocaine (EMLA) cream Apply topically as needed (30 minutes prior to port being accessed).    LIDOcaine-prilocaine (EMLA) cream Apply to port site 30 minutes prior to charlie    lisinopriL 10 MG tablet Take 10 mg by mouth.    mupirocin (BACTROBAN) 2 % ointment Apply topically.    ondansetron (ZOFRAN) 8 MG tablet Take 8 mg by mouth.    oxyCODONE (ROXICODONE) 15 MG Tab Take 7.5 mg by mouth.    prochlorperazine (COMPAZINE) 10 MG tablet Take 10 mg by mouth.    sulfamethoxazole-trimethoprim 800-160mg (BACTRIM DS) 800-160 mg Tab Take 1 tablet by mouth once daily.    triamcinolone acetonide 0.1% (KENALOG) 0.1 % cream Apply topically.    valACYclovir (VALTREX) 500 MG tablet Take 1 tablet (500 mg total) by mouth 2 (two) times daily.     No current facility-administered medications for this visit.     ALLERGIES:    Review of patient's allergies indicates:   Allergen Reactions    Iodinated contrast media Other (See Comments)     Pt states that he looks sunburnt afterwards    Vancomycin analogues Other (See Comments)     Red man syndrome         Review of Systems:     Review of Systems   Constitutional:  Positive for fatigue. Negative for appetite change, chills, diaphoresis, fever and unexpected weight change.   HENT:   Negative for hearing loss, mouth sores, nosebleeds, sore throat, trouble swallowing and voice change.    Eyes:  Negative for eye problems and icterus.   Respiratory:  Negative for chest tightness, cough, hemoptysis, shortness of breath and wheezing.    Cardiovascular:  Negative for chest pain, leg swelling and palpitations.   Gastrointestinal:  Negative for abdominal distention, abdominal pain, blood in stool, diarrhea, nausea and vomiting.   Endocrine: Negative for hot flashes.   Genitourinary:  Negative for bladder incontinence, difficulty urinating, dysuria and hematuria.    Musculoskeletal:  Negative for arthralgias, back pain, flank pain, gait problem, myalgias, neck pain and neck stiffness.   Skin:  Positive for itching and wound. Negative for rash.   Neurological:  Negative for dizziness, extremity weakness, gait problem, headaches, numbness, seizures and speech difficulty.   Hematological:  Negative for adenopathy. Does not bruise/bleed easily.   Psychiatric/Behavioral:  Negative for confusion, depression and sleep disturbance. The patient is not nervous/anxious.         Physical Exam:     Vitals:    02/23/23 1132   BP: (!) 175/82   Pulse: 96   Resp: 17   Temp: 98.4 °F (36.9 °C)       Physical Exam  Constitutional:       General: He is not in acute distress.     Appearance: He is well-developed. He is not diaphoretic.   HENT:      Head: Normocephalic and atraumatic.      Mouth/Throat:      Pharynx: No oropharyngeal exudate.   Eyes:      Conjunctiva/sclera: Conjunctivae normal.      Pupils: Pupils are  equal, round, and reactive to light.   Neck:      Thyroid: No thyromegaly.      Vascular: No JVD.      Trachea: No tracheal deviation.   Cardiovascular:      Rate and Rhythm: Normal rate and regular rhythm.      Heart sounds: Normal heart sounds. No murmur heard.    No friction rub.   Pulmonary:      Effort: Pulmonary effort is normal. No respiratory distress.      Breath sounds: Normal breath sounds. No stridor. No wheezing or rales.   Chest:      Chest wall: No tenderness.   Abdominal:      General: Bowel sounds are normal. There is no distension.      Palpations: Abdomen is soft.      Tenderness: There is no abdominal tenderness. There is no guarding or rebound.   Musculoskeletal:         General: No tenderness or deformity. Normal range of motion.      Cervical back: Normal range of motion and neck supple.      Comments: + axillary mass.    Skin:     General: Skin is warm and dry.      Capillary Refill: Capillary refill takes less than 2 seconds.      Coloration: Skin is not pale.      Findings: No erythema, lesion or rash.   Neurological:      Mental Status: He is alert and oriented to person, place, and time.      Cranial Nerves: No cranial nerve deficit.      Sensory: No sensory deficit.      Motor: No abnormal muscle tone.      Coordination: Coordination normal.      Deep Tendon Reflexes: Reflexes normal.   Psychiatric:         Behavior: Behavior normal.         Thought Content: Thought content normal.         Judgment: Judgment normal.       ECOG Performance Status: (foot note - ECOG PS provided by Eastern Cooperative Oncology Group) 0 - Asymptomatic    Karnofsky Performance Score:  100%- Normal, No Complaints, No Evidence of Disease    Labs:   Lab Results   Component Value Date    WBC 4.64 02/23/2023    HGB 11.1 (L) 02/23/2023    HCT 32.3 (L) 02/23/2023     02/23/2023    TRIG 329 (H) 10/19/2022    ALT 60 (H) 02/23/2023    AST 57 (H) 02/23/2023     02/23/2023    K 4.4 02/23/2023      02/23/2023    CREATININE 1.0 02/23/2023    BUN 6 (L) 02/23/2023    CO2 26 02/23/2023    INR 1.1 01/05/2023    HGBA1C 6.0 (H) 10/26/2022         Imaging: Previous imaging has been reviewed     Assessment and Plan:     Mr. Armstrong is a pleasant 72 year old male with  peripheral T cell lymphoma    Peripheral T Cell Lymphoma  --stage IIB ([id number])   --lymph node biopsy as PERIPHERAL T-CELL LYMPHOMA WITH EXTENSIVE NECROSIS-  --Plan to continue CHP-BV for 1 more cycles [received first 2 cycles at [facility]]  --On triple prophylaxis from [facility]  --Okay to proceed with cycle 5 today  --attempted to do mid treatment PET prior to this cycle, but elevated glucoses prevented the scan from taking place.   --Plan for end of treatment PET in 6 weeks. He is aware that he needs glucose control and is now willing to see a PCP  --Advised to receive the remainder of therapy at the same facility      DVT  --On eliquis    Hypertension  --Currently taking lisinopril  --Poorly controlled based on our reads  --Will add additional agent  --Referral to PCP        30 minutes were spent face to face with the patient to discuss the disease, natural history, treatment options and survival statistics. I have provided the patient with an opportunity to ask questions and have all questions answered to his satisfaction.       he will return to clinic in 6 weeks with PET, but knows to call in the interim if symptoms change or should a problem arise.        Teodora Rashid MD  Hematology and Medical Oncology  Bone Marrow Transplant  [facility] [place]      BMT Chart Routing      Follow up with physician 6 weeks. 1. PET in 6 weeks [mid day in [place]] 2. see me 1-2 days after PET with labs [cbc,cmp, ldh, uric acid] needs to be me this is an end of treatment discussion 3. referral topcp in [place]   Follow up with MICHELLE    Provider visit type    Infusion scheduling note    Injection scheduling note    Labs    Imaging    Pharmacy appointment    Other  referrals

## 2023-02-24 ENCOUNTER — INFUSION (OUTPATIENT)
Dept: INFUSION THERAPY | Facility: HOSPITAL | Age: 73
End: 2023-02-24
Payer: MEDICARE

## 2023-02-24 DIAGNOSIS — C84.44 PERIPHERAL T CELL LYMPHOMA OF AXILLARY LYMPH NODES: Primary | ICD-10-CM

## 2023-02-24 PROCEDURE — 96372 THER/PROPH/DIAG INJ SC/IM: CPT

## 2023-02-24 PROCEDURE — 63600175 PHARM REV CODE 636 W HCPCS: Mod: TB,JG | Performed by: INTERNAL MEDICINE

## 2023-02-24 RX ADMIN — PEGFILGRASTIM-CBQV 6 MG: 6 INJECTION, SOLUTION SUBCUTANEOUS at 12:02

## 2023-03-08 ENCOUNTER — PATIENT MESSAGE (OUTPATIENT)
Dept: HEMATOLOGY/ONCOLOGY | Facility: CLINIC | Age: 73
End: 2023-03-08
Payer: MEDICARE

## 2023-03-15 ENCOUNTER — HOSPITAL ENCOUNTER (OUTPATIENT)
Dept: RADIOLOGY | Facility: HOSPITAL | Age: 73
Discharge: HOME | End: 2023-03-15
Attending: INTERNAL MEDICINE
Payer: MEDICARE

## 2023-03-15 ENCOUNTER — PATIENT MESSAGE (OUTPATIENT)
Dept: HEMATOLOGY/ONCOLOGY | Facility: CLINIC | Age: 73
End: 2023-03-15
Payer: MEDICARE

## 2023-03-15 LAB — POCT GLUCOSE: 155 MG/DL (ref 70–110)

## 2023-03-15 PROCEDURE — 78816 PET IMAGE W/CT FULL BODY: CPT | Mod: TC,PI

## 2023-03-15 PROCEDURE — 78816 NM PET CT WHOLE BODY: ICD-10-PCS | Mod: 26,PS,, | Performed by: RADIOLOGY

## 2023-03-15 PROCEDURE — 78816 PET IMAGE W/CT FULL BODY: CPT | Mod: 26,PS,, | Performed by: RADIOLOGY

## 2023-03-15 PROCEDURE — A9552 F18 FDG: HCPCS

## 2023-03-16 ENCOUNTER — INFUSION (OUTPATIENT)
Dept: INFUSION THERAPY | Facility: HOSPITAL | Age: 73
End: 2023-03-16
Payer: MEDICARE

## 2023-03-16 ENCOUNTER — OFFICE VISIT (OUTPATIENT)
Dept: HEMATOLOGY/ONCOLOGY | Facility: CLINIC | Age: 73
End: 2023-03-16
Payer: MEDICARE

## 2023-03-16 VITALS — SYSTOLIC BLOOD PRESSURE: 152 MMHG | DIASTOLIC BLOOD PRESSURE: 70 MMHG | RESPIRATION RATE: 18 BRPM | HEART RATE: 62 BPM

## 2023-03-16 VITALS
HEART RATE: 83 BPM | HEIGHT: 73 IN | TEMPERATURE: 98 F | DIASTOLIC BLOOD PRESSURE: 69 MMHG | WEIGHT: 252.13 LBS | OXYGEN SATURATION: 98 % | RESPIRATION RATE: 20 BRPM | BODY MASS INDEX: 33.41 KG/M2 | SYSTOLIC BLOOD PRESSURE: 144 MMHG

## 2023-03-16 DIAGNOSIS — E11.9 TYPE 2 DIABETES MELLITUS WITHOUT COMPLICATION, WITHOUT LONG-TERM CURRENT USE OF INSULIN: ICD-10-CM

## 2023-03-16 DIAGNOSIS — C84.00 MYCOSIS FUNGOIDES, UNSPECIFIED BODY REGION: ICD-10-CM

## 2023-03-16 DIAGNOSIS — C84.09 MYCOSIS FUNGOIDES, EXTRANODAL AND SOLID ORGAN SITES: Primary | ICD-10-CM

## 2023-03-16 DIAGNOSIS — I10 HYPERTENSION, UNSPECIFIED TYPE: ICD-10-CM

## 2023-03-16 DIAGNOSIS — I82.402 ACUTE DEEP VEIN THROMBOSIS (DVT) OF LEFT LOWER EXTREMITY, UNSPECIFIED VEIN: ICD-10-CM

## 2023-03-16 DIAGNOSIS — C84.44 PERIPHERAL T CELL LYMPHOMA OF AXILLARY LYMPH NODES: Primary | ICD-10-CM

## 2023-03-16 PROCEDURE — 96411 CHEMO IV PUSH ADDL DRUG: CPT

## 2023-03-16 PROCEDURE — 99999 PR PBB SHADOW E&M-EST. PATIENT-LVL IV: CPT | Mod: PBBFAC,,, | Performed by: INTERNAL MEDICINE

## 2023-03-16 PROCEDURE — 63600175 PHARM REV CODE 636 W HCPCS: Mod: JG | Performed by: INTERNAL MEDICINE

## 2023-03-16 PROCEDURE — 96367 TX/PROPH/DG ADDL SEQ IV INF: CPT

## 2023-03-16 PROCEDURE — 96417 CHEMO IV INFUS EACH ADDL SEQ: CPT

## 2023-03-16 PROCEDURE — 99215 OFFICE O/P EST HI 40 MIN: CPT | Mod: S$PBB,,, | Performed by: INTERNAL MEDICINE

## 2023-03-16 PROCEDURE — 99215 PR OFFICE/OUTPT VISIT, EST, LEVL V, 40-54 MIN: ICD-10-PCS | Mod: S$PBB,,, | Performed by: INTERNAL MEDICINE

## 2023-03-16 PROCEDURE — 96413 CHEMO IV INFUSION 1 HR: CPT

## 2023-03-16 PROCEDURE — 99999 PR PBB SHADOW E&M-EST. PATIENT-LVL IV: ICD-10-PCS | Mod: PBBFAC,,, | Performed by: INTERNAL MEDICINE

## 2023-03-16 PROCEDURE — 99214 OFFICE O/P EST MOD 30 MIN: CPT | Mod: PBBFAC,25 | Performed by: INTERNAL MEDICINE

## 2023-03-16 PROCEDURE — A4216 STERILE WATER/SALINE, 10 ML: HCPCS | Performed by: INTERNAL MEDICINE

## 2023-03-16 PROCEDURE — 25000003 PHARM REV CODE 250: Performed by: INTERNAL MEDICINE

## 2023-03-16 RX ORDER — DOXORUBICIN HYDROCHLORIDE 2 MG/ML
50 INJECTION, SOLUTION INTRAVENOUS
Status: COMPLETED | OUTPATIENT
Start: 2023-03-16 | End: 2023-03-16

## 2023-03-16 RX ORDER — HEPARIN 100 UNIT/ML
500 SYRINGE INTRAVENOUS
Status: CANCELLED | OUTPATIENT
Start: 2023-03-16

## 2023-03-16 RX ORDER — SODIUM CHLORIDE 0.9 % (FLUSH) 0.9 %
10 SYRINGE (ML) INJECTION
Status: CANCELLED | OUTPATIENT
Start: 2023-03-16

## 2023-03-16 RX ORDER — DOXORUBICIN HYDROCHLORIDE 2 MG/ML
50 INJECTION, SOLUTION INTRAVENOUS
Status: CANCELLED | OUTPATIENT
Start: 2023-03-16

## 2023-03-16 RX ORDER — SODIUM CHLORIDE 0.9 % (FLUSH) 0.9 %
10 SYRINGE (ML) INJECTION
Status: DISCONTINUED | OUTPATIENT
Start: 2023-03-16 | End: 2023-03-16 | Stop reason: HOSPADM

## 2023-03-16 RX ORDER — HEPARIN 100 UNIT/ML
500 SYRINGE INTRAVENOUS
Status: DISCONTINUED | OUTPATIENT
Start: 2023-03-16 | End: 2023-03-16 | Stop reason: HOSPADM

## 2023-03-16 RX ADMIN — BRENTUXIMAB VEDOTIN 180 MG: 50 INJECTION, POWDER, LYOPHILIZED, FOR SOLUTION INTRAVENOUS at 12:03

## 2023-03-16 RX ADMIN — DOXORUBICIN HYDROCHLORIDE 128 MG: 2 INJECTION, SOLUTION INTRAVENOUS at 11:03

## 2023-03-16 RX ADMIN — Medication 10 ML: at 01:03

## 2023-03-16 RX ADMIN — HEPARIN 500 UNITS: 100 SYRINGE at 01:03

## 2023-03-16 RX ADMIN — CYCLOPHOSPHAMIDE 1920 MG: 200 INJECTION, SOLUTION INTRAVENOUS at 11:03

## 2023-03-16 RX ADMIN — DEXAMETHASONE SODIUM PHOSPHATE 0.25 MG: 4 INJECTION, SOLUTION INTRA-ARTICULAR; INTRALESIONAL; INTRAMUSCULAR; INTRAVENOUS; SOFT TISSUE at 10:03

## 2023-03-16 NOTE — PLAN OF CARE
1030 Labs, hx, and medications reviewed, pt meets parameters for treatment today. Assessment completed and plan of care reviewed. Pt verbalized understanding. PAC accessed with no complications. Pt voices no new complaints or concerns, will continue to monitor for safety.

## 2023-03-16 NOTE — PROGRESS NOTES
c  Hematology and Medical Oncology   Follow up     03/16/2023      Primary Hematologic Diagnosis: Mycosis Fungoides      History of Present Ilness:   Randy Armstrong (David) is a pleasant 72 y.o.male who presents today as a self referral. Has received the first 2 cycles of chemo at [facility] and recently relocated to [place] at least temporarily and would like to continue therapy. Currently residing with ex-wife and ex-sister in law.      Hematologic History of his Mycosis fungoides:  --2020: developed rash on legs, initially treated as ringworm with local physician  --9/16/21: Biopsy with local dermatologist with [facility], read at [facility] per below, initially treated with silvadene. Pt did not have insurance so no further work-up was performed.  --10/2021: Started developing tumors, did not see dermatologist again due to insurance issues, they sent rx for halobetasol and valchlor. He was not able to start valchlor due to cost.  --1/2022: Started tanning bed, tumors worsening    --PET on 9/26/22:  1. New and increased lymphadenopathy left axilla, right groin, and right popliteal fossa.  2. New F-18 FDG avid intramuscular mass in the right calf.   3. Increased size and avidity of the subcutaneous left gluteal cleft lesion.   4. Decreased size and avidity of cutaneous lesions at the left eyelid, left upper back, left arm, right lower abdominal wall, bilateral groin, and left foot.   5. Decreased/resolved mildly F-18 FDG avid left cervical lymph nodes.     10/4/2022 - 10/5/2022 [place]-Radiation Therapy   Left inner thigh 8Gy in 2 fractions     Reportedly hospitalized the week of 10/24/22 for worsening skin infections and received CHP+ Brentuximab while inpatient.     C1D1: 10/28, C2D1:11/17    He continues to endorse ongoing open wounds to extremities, which he keeps covered with bandages for a week + at a time with a single bandage. + drainage from lesions.    Experienced generalized itching for several days after PET  scan    --Post treatment PET on 3/15/23: Multiple davis and subcutaneous lesions, as well as a right calf intramuscular lesion:  Left axillary node measuring 5.7 x 3.0 cm with SUV max 16 (axial image 114).  Left elbow skin thickening with associated uptake, SUV max 5.9 (axial image 152).  Uptake at the left hand is favored related to injection.  Left gluteal cleft cutaneous lesion SUV max 2.3 (axial image 311).  Left inguinal node 0.8 cm with SUV max 5.9 (axial image 268).  Right inguinal non-hypermetabolic 1.2 cm nodule (CT image 258).  Right distal medial thigh soft tissue nodule 1.2 cm with SUV max 1.7 (leg axial image 75).  Few additional smaller nodules near the popliteal fossa.  Right calf subcutaneous nodule 1.0 cm with SUV max 5.6 (leg axial image 180).  Soft tissue thickening overlying the right gastrocnemius with associated hypermetabolic activity, SUV max 4.4 (leg axial image 163).  This hypermetabolic activity also involves the muscle.     Interval History:  Mr. Armstrong is doing well. Tolerated the last chemo without significant issue. Experienced some taste bud changes. Feels that he cannot taste almost any food. Therefor he doesn't try to eat.     Not able to get the mid treatment PET prior to this cycle either  due to elevated blood glucose.He had not eaten that day,but he feels his blood sugar is always elevated in the mornings and then normalizes by mid day.      Wounds continue to heal. Feels that the axilla still needs xrt. Mass is getting harder and growing.      PAST MEDICAL HISTORY:     Diabetes  Gout  Hypertension    PAST SURGICAL HISTORY:   No past surgical history on file.    PAST SOCIAL HISTORY:   reports that he has never smoked. He has never used smokeless tobacco.    FAMILY HISTORY:  No family history on file.    CURRENT MEDICATIONS:   Current Outpatient Medications   Medication Sig    allopurinoL (ZYLOPRIM) 300 MG tablet TAKE 1 TABLET BY MOUTH 2 TIMES DAILY.    apixaban (ELIQUIS) 5 mg  Tab Take 5 mg by mouth.    clobetasol 0.05% (TEMOVATE) 0.05 % Oint Apply topically.    famotidine (PEPCID) 20 MG tablet Take 20 mg by mouth.    LIDOcaine-prilocaine (EMLA) cream Apply topically as needed (30 minutes prior to port being accessed).    LIDOcaine-prilocaine (EMLA) cream Apply to port site 30 minutes prior to charlie    lisinopriL 10 MG tablet Take 10 mg by mouth.    mupirocin (BACTROBAN) 2 % ointment Apply topically.    ondansetron (ZOFRAN) 8 MG tablet Take 8 mg by mouth.    oxyCODONE (ROXICODONE) 15 MG Tab Take 7.5 mg by mouth.    prochlorperazine (COMPAZINE) 10 MG tablet Take 10 mg by mouth.    sulfamethoxazole-trimethoprim 800-160mg (BACTRIM DS) 800-160 mg Tab Take 1 tablet by mouth once daily.    triamcinolone acetonide 0.1% (KENALOG) 0.1 % cream Apply topically.    valACYclovir (VALTREX) 500 MG tablet Take 1 tablet (500 mg total) by mouth 2 (two) times daily.     No current facility-administered medications for this visit.     ALLERGIES:   Review of patient's allergies indicates:   Allergen Reactions    Iodinated contrast media Other (See Comments)     Pt states that he looks sunburnt afterwards    Vancomycin analogues Other (See Comments)     Red man syndrome         Review of Systems:     Review of Systems   Constitutional:  Positive for fatigue. Negative for appetite change, chills, diaphoresis, fever and unexpected weight change.   HENT:   Negative for hearing loss, mouth sores, nosebleeds, sore throat, trouble swallowing and voice change.    Eyes:  Negative for eye problems and icterus.   Respiratory:  Negative for chest tightness, cough, hemoptysis, shortness of breath and wheezing.    Cardiovascular:  Negative for chest pain, leg swelling and palpitations.   Gastrointestinal:  Negative for abdominal distention, abdominal pain, blood in stool, diarrhea, nausea and vomiting.   Endocrine: Negative for hot flashes.   Genitourinary:  Negative for bladder incontinence, difficulty urinating, dysuria  and hematuria.    Musculoskeletal:  Negative for arthralgias, back pain, flank pain, gait problem, myalgias, neck pain and neck stiffness.   Skin:  Positive for itching and wound. Negative for rash.   Neurological:  Negative for dizziness, extremity weakness, gait problem, headaches, numbness, seizures and speech difficulty.   Hematological:  Negative for adenopathy. Does not bruise/bleed easily.   Psychiatric/Behavioral:  Negative for confusion, depression and sleep disturbance. The patient is not nervous/anxious.         Physical Exam:     Vitals:    03/16/23 0915   BP: (!) 144/69   Pulse: 83   Resp: 20   Temp: 97.8 °F (36.6 °C)       Physical Exam  Constitutional:       General: He is not in acute distress.     Appearance: He is well-developed. He is not diaphoretic.   HENT:      Head: Normocephalic and atraumatic.      Mouth/Throat:      Pharynx: No oropharyngeal exudate.   Eyes:      Conjunctiva/sclera: Conjunctivae normal.      Pupils: Pupils are equal, round, and reactive to light.   Neck:      Thyroid: No thyromegaly.      Vascular: No JVD.      Trachea: No tracheal deviation.   Cardiovascular:      Rate and Rhythm: Normal rate and regular rhythm.      Heart sounds: Normal heart sounds. No murmur heard.    No friction rub.   Pulmonary:      Effort: Pulmonary effort is normal. No respiratory distress.      Breath sounds: Normal breath sounds. No stridor. No wheezing or rales.   Chest:      Chest wall: No tenderness.   Abdominal:      General: Bowel sounds are normal. There is no distension.      Palpations: Abdomen is soft.      Tenderness: There is no abdominal tenderness. There is no guarding or rebound.   Musculoskeletal:         General: No tenderness or deformity. Normal range of motion.      Cervical back: Normal range of motion and neck supple.      Comments: + axillary mass.    Skin:     General: Skin is warm and dry.      Capillary Refill: Capillary refill takes less than 2 seconds.      Coloration:  Skin is not pale.      Findings: No erythema, lesion or rash.   Neurological:      Mental Status: He is alert and oriented to person, place, and time.      Cranial Nerves: No cranial nerve deficit.      Sensory: No sensory deficit.      Motor: No abnormal muscle tone.      Coordination: Coordination normal.      Deep Tendon Reflexes: Reflexes normal.   Psychiatric:         Behavior: Behavior normal.         Thought Content: Thought content normal.         Judgment: Judgment normal.       ECOG Performance Status: (foot note - ECOG PS provided by Eastern Cooperative Oncology Group) 0 - Asymptomatic    Karnofsky Performance Score:  100%- Normal, No Complaints, No Evidence of Disease    Labs:   Lab Results   Component Value Date    WBC 3.46 (L) 03/16/2023    HGB 9.9 (L) 03/16/2023    HCT 30.2 (L) 03/16/2023     03/16/2023    TRIG 329 (H) 10/19/2022    ALT 51 (H) 03/16/2023    AST 41 (H) 03/16/2023     03/16/2023    K 4.5 03/16/2023     03/16/2023    CREATININE 1.0 03/16/2023    BUN 7 (L) 03/16/2023    CO2 23 03/16/2023    INR 1.1 01/05/2023    HGBA1C 6.0 (H) 10/26/2022         Imaging: Previous imaging has been reviewed     Assessment and Plan:     Mr. Armstrong is a pleasant 72 year old male with  peripheral T cell lymphoma    Peripheral T Cell Lymphoma  --stage IIB ([id number])   --lymph node biopsy as PERIPHERAL T-CELL LYMPHOMA WITH EXTENSIVE NECROSIS-  --Completed CHP-BV   --On triple prophylaxis from [facility]  --attempted to do mid treatment PET prior to this cycle, but elevated glucoses prevented the scan from taking place.   --End of treatment PET shows ongoing active disease  --Referral to radiation oncology for localized xrt        DVT  --On eliquis    Hypertension  --Currently taking lisinopril  --Poorly controlled based on our reads  --Will add additional agent  --Referral to PCP        30 minutes were spent face to face with the patient to discuss the disease, natural history, treatment options and  survival statistics. I have provided the patient with an opportunity to ask questions and have all questions answered to his satisfaction.       he will return to clinic in 4 weeks, but knows to call in the interim if symptoms change or should a problem arise.        Teodora Rashid MD  Hematology and Medical Oncology  Bone Marrow Transplant  [facility]      BMT Chart Routing  Urgent    Follow up with physician 4 weeks. 1. referral to rad onc for axillary xrt 2.see me in 4 weeks for symtpom check   Follow up with [facility]    Provider visit type    Infusion scheduling note    Injection scheduling note    Labs    Imaging    Pharmacy appointment    Other referrals

## 2023-03-16 NOTE — PLAN OF CARE
1320 Pt tolerated A/C and Adcetris infusions well today, no complaints or complications,. VSS through duration of treatment. Pt aware to call provider with any questions or concerns and is aware of upcoming appts. Pt ambulatory from clinic with steady gait, no distress noted.

## 2023-03-17 ENCOUNTER — INFUSION (OUTPATIENT)
Dept: INFUSION THERAPY | Facility: HOSPITAL | Age: 73
End: 2023-03-17
Payer: MEDICARE

## 2023-03-17 DIAGNOSIS — C84.44 PERIPHERAL T CELL LYMPHOMA OF AXILLARY LYMPH NODES: Primary | ICD-10-CM

## 2023-03-17 PROCEDURE — 96372 THER/PROPH/DIAG INJ SC/IM: CPT

## 2023-03-17 PROCEDURE — 63600175 PHARM REV CODE 636 W HCPCS: Mod: JZ,JG | Performed by: INTERNAL MEDICINE

## 2023-03-17 RX ADMIN — PEGFILGRASTIM-CBQV 6 MG: 6 INJECTION, SOLUTION SUBCUTANEOUS at 10:03

## 2023-03-17 NOTE — NURSING
Patient tolerated Udenyca with no complications. VSS. Pt instructed to call [facility] with any problems. Pt discharged home independently.

## 2023-03-28 NOTE — PROGRESS NOTES
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 Armstrong is a 72-year-old male with history of T-cell lymphoma with multiple davis and subcutaneous involvement who was diagnosed in 2020 after he presented with rash on his legs.  Most of the information was obtained from consult note from Dr. Rashid.  A biopsy on September 16, 2021 revealed mycosis fungoides.  He did not have insurance at that time and no further workup was done.  He started developing tumors in multiple areas.  A PET scan on September 26, 2022 revealed worsening lymphadenopathy of the left axilla, right groin, and right popliteal fossa.  There is increased size and avidity of subcutaneous nodules noted.  He history of total skin irradiation at [facility] which was done in July 2022.  He also has history of radiation to left inner thigh in October 2022 receiving 8 Gy in 2 fractions to left inner thigh per notes by Dr. Rashid. He was started on chemotherapy at [facility].  He then transferred his care here and completed planned course of CHP+ Brentuximab under the care of Dr. Rashid.  A repeat PET scan on March 15, 2023 revealed increase in uptake and size of multiple lesions including left axillary mass measuring 5.7 x 3 cm with SUV max of 16 (see below).  He is recommended to undergo palliative radiation to the left axilla.  He is here today for discussion regarding that.     Post treatment PET on 3/15/23: Multiple davis and subcutaneous lesions, as well as a right calf intramuscular lesion:  Left axillary node measuring 5.7 x 3.0 cm with SUV max 16 (axial image 114).  Left elbow skin thickening with associated uptake, SUV max 5.9 (axial image 152).  Uptake at the left hand is favored related to injection.  Left gluteal cleft cutaneous lesion SUV max 2.3 (axial image 311).  Left inguinal node 0.8 cm with SUV max 5.9 (axial image 268).  Right inguinal  non-hypermetabolic 1.2 cm nodule (CT image 258).  Right distal medial thigh soft tissue nodule 1.2 cm with SUV max 1.7 (leg axial image 75).  Few additional smaller nodules near the popliteal fossa.  Right calf subcutaneous nodule 1.0 cm with SUV max 5.6 (leg axial image 180).  Soft tissue thickening overlying the right gastrocnemius with associated hypermetabolic activity, SUV max 4.4 (leg axial image 163).  This hypermetabolic activity also involves the muscle.      At present, he reports drainage from the left axillary mass which resolved with antibiotics.  He denies pain or edema of the left axilla or left arm.  He also denies fever, night sweats, or weight loss.    REVIEW OF SYSTEMS:  As above.  In addition, patient denies headaches, visual problems, dizziness, chest pain, shortness of breath, cough, nausea, vomiting, diarrhea, or any new bony pains.     ECO    PAST MEDICAL HISTORY:  Past Medical History:   Diagnosis Date    Cancer        PAST SURGICAL HISTORY:  No past surgical history on file.    ALLERGIES:   Review of patient's allergies indicates:   Allergen Reactions    Iodinated contrast media Other (See Comments)     Pt states that he looks sunburnt afterwards    Vancomycin analogues Other (See Comments)     Red man syndrome       MEDICATIONS:  Current Outpatient Medications   Medication Sig    allopurinoL (ZYLOPRIM) 300 MG tablet TAKE 1 TABLET BY MOUTH 2 TIMES DAILY.    apixaban (ELIQUIS) 5 mg Tab Take 1 tablet (5 mg total) by mouth 2 (two) times daily.    clobetasol 0.05% (TEMOVATE) 0.05 % Oint Apply topically.    famotidine (PEPCID) 20 MG tablet Take 20 mg by mouth.    LIDOcaine-prilocaine (EMLA) cream Apply topically as needed (30 minutes prior to port being accessed).    LIDOcaine-prilocaine (EMLA) cream Apply to port site 30 minutes prior to charlie    lisinopriL 10 MG tablet Take 10 mg by mouth.    mupirocin (BACTROBAN) 2 % ointment Apply topically.    ondansetron (ZOFRAN) 8 MG tablet Take 8 mg  by mouth.    oxyCODONE (ROXICODONE) 15 MG Tab Take 7.5 mg by mouth.    prochlorperazine (COMPAZINE) 10 MG tablet Take 10 mg by mouth.    sulfamethoxazole-trimethoprim 800-160mg (BACTRIM DS) 800-160 mg Tab Take 1 tablet by mouth once daily.    triamcinolone acetonide 0.1% (KENALOG) 0.1 % cream Apply topically.    valACYclovir (VALTREX) 500 MG tablet Take 1 tablet (500 mg total) by mouth 2 (two) times daily.     No current facility-administered medications for this visit.       SOCIAL HISTORY:  Social History     Socioeconomic History    Marital status:    Tobacco Use    Smoking status: Never    Smokeless tobacco: Never       FAMILY HISTORY:  No family history on file.      PHYSICAL EXAMINATION:  Vitals:    03/29/23 0946   BP: (!) 174/78   Pulse: (!) 117   Resp: 18   Temp: 98.2 °F (36.8 °C)   Weight: 111 kg (244 lb 11.2 oz)   Body mass index is 32.28 kg/m².   GENERAL: Patient is alert and oriented, in no acute distress.   HEENT:Extraocular muscles are intact.  Oropharynx is clear without lesions.  There is no cervical or supraclavicular lymphadenopathy palpated.  No thyromegaly noted.  HEART: Regular rate and rhythm.  LUNGS: Clear to auscultation bilaterally.  ABDOMEN:Soft, nontender, nondistended, without hepatosplenomegaly.  Normoactive bowel sounds.  EXTREMITIES: No clubbing, cyanosis, or edema.  Palpable lymphadenopathy involving the left axilla without tenderness or discharge  NEUROLOGICAL: Cranial nerve II through XII grossly intact.  Sensation is intact.  Strength is 5 out of 5 in the upper and lower extremities bilaterally.  Multiple skin lesions which are in different stages of healing noted throughout.    ASSESSMENT:   This is a 72-year-old male with T-cell lymphoma/mycosis fungoides who completed chemotherapy with enlarging left axillary mass with history of total skin irradiation in July 2022 at [facility] and spot treatments.    PLAN:   After review of the images of the PET scan, Mr. Armstrong is  noted to have enlarging left axillary lymphadenopathy.  He also reports history of drainage and edema associated with the mass.  He is recommended to undergo palliative radiation to this region.  I plan to deliver approximately 12 Gy in 3 fractions.    The risks, benefits, and side effects of radiation were explained in detail to the patient.  All questions were answered and informed consent was signed.  I plan to see the patient back for radiation planning CT as soon as possible.    Psychosocial Distress screening score of Distress Score: 0 - No Distress noted and reviewed. No intervention indicated.     I spent approximately 60 minutes reviewing the available records and evaluating the patient, out of which over 50% of the time was spent face to face with the patient in counseling and coordinating this patient's care.

## 2023-03-29 ENCOUNTER — OFFICE VISIT (OUTPATIENT)
Dept: RADIATION ONCOLOGY | Facility: CLINIC | Age: 73
End: 2023-03-29
Payer: MEDICARE

## 2023-03-29 VITALS
BODY MASS INDEX: 32.28 KG/M2 | RESPIRATION RATE: 18 BRPM | WEIGHT: 244.69 LBS | HEART RATE: 117 BPM | TEMPERATURE: 98 F | DIASTOLIC BLOOD PRESSURE: 78 MMHG | SYSTOLIC BLOOD PRESSURE: 174 MMHG

## 2023-03-29 DIAGNOSIS — C84.09 MYCOSIS FUNGOIDES, EXTRANODAL AND SOLID ORGAN SITES: ICD-10-CM

## 2023-03-29 DIAGNOSIS — C84.08 MYCOSIS FUNGOIDES INVOLVING LYMPH NODES OF MULTIPLE REGIONS: ICD-10-CM

## 2023-03-29 DIAGNOSIS — C84.44 PERIPHERAL T CELL LYMPHOMA OF AXILLARY LYMPH NODES: Primary | ICD-10-CM

## 2023-03-29 PROCEDURE — 99214 OFFICE O/P EST MOD 30 MIN: CPT | Mod: PBBFAC | Performed by: RADIOLOGY

## 2023-03-29 PROCEDURE — 99204 PR OFFICE/OUTPT VISIT, NEW, LEVL IV, 45-59 MIN: ICD-10-PCS | Mod: S$PBB,,, | Performed by: RADIOLOGY

## 2023-03-29 PROCEDURE — 99204 OFFICE O/P NEW MOD 45 MIN: CPT | Mod: S$PBB,,, | Performed by: RADIOLOGY

## 2023-03-29 PROCEDURE — 99999 PR PBB SHADOW E&M-EST. PATIENT-LVL IV: ICD-10-PCS | Mod: PBBFAC,,, | Performed by: RADIOLOGY

## 2023-03-29 PROCEDURE — 99999 PR PBB SHADOW E&M-EST. PATIENT-LVL IV: CPT | Mod: PBBFAC,,, | Performed by: RADIOLOGY

## 2023-04-04 ENCOUNTER — TELEPHONE (OUTPATIENT)
Dept: HEMATOLOGY/ONCOLOGY | Facility: CLINIC | Age: 73
End: 2023-04-04
Payer: MEDICARE

## 2023-04-04 ENCOUNTER — HOSPITAL ENCOUNTER (OUTPATIENT)
Dept: RADIATION THERAPY | Facility: HOSPITAL | Age: 73
Discharge: HOME | End: 2023-04-04
Attending: INTERNAL MEDICINE
Payer: MEDICARE

## 2023-04-04 PROCEDURE — 77334 PR  RADN TREATMENT AID(S) COMPLX: ICD-10-PCS | Mod: 26,,, | Performed by: RADIOLOGY

## 2023-04-04 PROCEDURE — 77290 THER RAD SIMULAJ FIELD CPLX: CPT | Mod: TC | Performed by: RADIOLOGY

## 2023-04-04 PROCEDURE — 77014 HC CT GUIDANCE RADIATION THERAPY FLDS PLACEMENT: CPT | Mod: TC | Performed by: RADIOLOGY

## 2023-04-04 PROCEDURE — 77334 RADIATION TREATMENT AID(S): CPT | Mod: TC | Performed by: RADIOLOGY

## 2023-04-04 PROCEDURE — 77263 PR  RADIATION THERAPY PLAN COMPLEX: ICD-10-PCS | Mod: ,,, | Performed by: RADIOLOGY

## 2023-04-04 PROCEDURE — 77290 PR  SET RADN THERAPY FIELD COMPLEX: ICD-10-PCS | Mod: 26,,, | Performed by: RADIOLOGY

## 2023-04-04 PROCEDURE — 77263 THER RADIOLOGY TX PLNG CPLX: CPT | Mod: ,,, | Performed by: RADIOLOGY

## 2023-04-04 PROCEDURE — 77334 RADIATION TREATMENT AID(S): CPT | Mod: 26,,, | Performed by: RADIOLOGY

## 2023-04-04 PROCEDURE — 77290 THER RAD SIMULAJ FIELD CPLX: CPT | Mod: 26,,, | Performed by: RADIOLOGY

## 2023-04-04 NOTE — TELEPHONE ENCOUNTER
----- Message from [place] Chevalier sent at 4/4/2023  1:18 PM CDT -----  Regarding: pt advice  Scheduling Request    Appt Type:      Date/Time Preference: pt says need a break from treatment     Treating Provider: Jacky/[place]    Caller Name:Randy    Contact Preference: [phone]    Comments/notes Pt says he ws supposed to get a call re NM PET CT Routine FDG    pt also says don't wnt to do radiation and chemo at the same time.

## 2023-04-04 NOTE — TELEPHONE ENCOUNTER
Pt states he does not wish to continue with chemotherapy at this time.    Discussed with Dr. Rashid who states pt has completed chemotherapy and does not need labs and visit with Dr. Rico this week. Appts cancelled per Dr. Rashid. Pt to follow up in 1 mo with labs prior per Dr. Rashid.    Notified pt of the above. Pt agreeable to plan. Appts scheduled.

## 2023-04-10 PROCEDURE — 77334 RADIATION TREATMENT AID(S): CPT | Mod: TC | Performed by: RADIOLOGY

## 2023-04-10 PROCEDURE — 77300 PR RADIATION THERAPY,DOSIMETRY PLAN: ICD-10-PCS | Mod: 26,,, | Performed by: RADIOLOGY

## 2023-04-10 PROCEDURE — 77300 RADIATION THERAPY DOSE PLAN: CPT | Mod: TC | Performed by: RADIOLOGY

## 2023-04-10 PROCEDURE — 77295 3-D RADIOTHERAPY PLAN: CPT | Mod: TC | Performed by: RADIOLOGY

## 2023-04-10 PROCEDURE — 77334 RADIATION TREATMENT AID(S): CPT | Mod: 26,,, | Performed by: RADIOLOGY

## 2023-04-10 PROCEDURE — 77295 3-D RADIOTHERAPY PLAN: CPT | Mod: 26,,, | Performed by: RADIOLOGY

## 2023-04-10 PROCEDURE — 77334 PR  RADN TREATMENT AID(S) COMPLX: ICD-10-PCS | Mod: 26,,, | Performed by: RADIOLOGY

## 2023-04-10 PROCEDURE — 77300 RADIATION THERAPY DOSE PLAN: CPT | Mod: 26,,, | Performed by: RADIOLOGY

## 2023-04-10 PROCEDURE — 77295 PR 3D RADIOTHERAPY PLAN: ICD-10-PCS | Mod: 26,,, | Performed by: RADIOLOGY

## 2023-04-11 PROCEDURE — G6002 PR STEREOSCOPIC XRAY GUIDE FOR RADIATION TX DELIV: ICD-10-PCS | Mod: 26,,, | Performed by: RADIOLOGY

## 2023-04-11 PROCEDURE — 77412 RADIATION TX DELIVERY LVL 3: CPT | Performed by: RADIOLOGY

## 2023-04-11 PROCEDURE — 77387 GUIDANCE FOR RADJ TX DLVR: CPT | Mod: TC | Performed by: RADIOLOGY

## 2023-04-11 PROCEDURE — G6002 STEREOSCOPIC X-RAY GUIDANCE: HCPCS | Mod: 26,,, | Performed by: RADIOLOGY

## 2023-04-11 PROCEDURE — 77417 THER RADIOLOGY PORT IMAGE(S): CPT | Performed by: RADIOLOGY

## 2023-04-12 PROCEDURE — 77387 GUIDANCE FOR RADJ TX DLVR: CPT | Mod: TC | Performed by: RADIOLOGY

## 2023-04-12 PROCEDURE — G6002 PR STEREOSCOPIC XRAY GUIDE FOR RADIATION TX DELIV: ICD-10-PCS | Mod: 26,,, | Performed by: RADIOLOGY

## 2023-04-12 PROCEDURE — 77412 RADIATION TX DELIVERY LVL 3: CPT | Performed by: RADIOLOGY

## 2023-04-12 PROCEDURE — G6002 STEREOSCOPIC X-RAY GUIDANCE: HCPCS | Mod: 26,,, | Performed by: RADIOLOGY

## 2023-04-13 PROCEDURE — G6002 PR STEREOSCOPIC XRAY GUIDE FOR RADIATION TX DELIV: ICD-10-PCS | Mod: 26,,, | Performed by: RADIOLOGY

## 2023-04-13 PROCEDURE — G6002 STEREOSCOPIC X-RAY GUIDANCE: HCPCS | Mod: 26,,, | Performed by: RADIOLOGY

## 2023-04-13 PROCEDURE — 77412 RADIATION TX DELIVERY LVL 3: CPT | Performed by: RADIOLOGY

## 2023-04-13 PROCEDURE — 77336 RADIATION PHYSICS CONSULT: CPT | Performed by: RADIOLOGY

## 2023-04-13 PROCEDURE — 77387 GUIDANCE FOR RADJ TX DLVR: CPT | Mod: TC | Performed by: RADIOLOGY

## 2023-05-04 ENCOUNTER — OFFICE VISIT (OUTPATIENT)
Dept: HEMATOLOGY/ONCOLOGY | Facility: CLINIC | Age: 73
End: 2023-05-04
Payer: MEDICARE

## 2023-05-04 ENCOUNTER — LAB VISIT (OUTPATIENT)
Dept: LAB | Facility: HOSPITAL | Age: 73
End: 2023-05-04
Payer: MEDICARE

## 2023-05-04 VITALS
WEIGHT: 241.19 LBS | BODY MASS INDEX: 31.96 KG/M2 | OXYGEN SATURATION: 98 % | HEIGHT: 73 IN | TEMPERATURE: 99 F | SYSTOLIC BLOOD PRESSURE: 147 MMHG | HEART RATE: 68 BPM | DIASTOLIC BLOOD PRESSURE: 75 MMHG | RESPIRATION RATE: 18 BRPM

## 2023-05-04 DIAGNOSIS — C84.08 MYCOSIS FUNGOIDES INVOLVING LYMPH NODES OF MULTIPLE REGIONS: Primary | ICD-10-CM

## 2023-05-04 DIAGNOSIS — T14.8XXD WOUND HEALING, DELAYED: ICD-10-CM

## 2023-05-04 DIAGNOSIS — C84.09 MYCOSIS FUNGOIDES, EXTRANODAL AND SOLID ORGAN SITES: ICD-10-CM

## 2023-05-04 DIAGNOSIS — I10 HYPERTENSION, UNSPECIFIED TYPE: ICD-10-CM

## 2023-05-04 DIAGNOSIS — E11.9 TYPE 2 DIABETES MELLITUS WITHOUT COMPLICATION, WITHOUT LONG-TERM CURRENT USE OF INSULIN: ICD-10-CM

## 2023-05-04 LAB
ALBUMIN SERPL BCP-MCNC: 3.7 G/DL (ref 3.5–5.2)
ALP SERPL-CCNC: 82 U/L (ref 55–135)
ALT SERPL W/O P-5'-P-CCNC: 30 U/L (ref 10–44)
ANION GAP SERPL CALC-SCNC: 8 MMOL/L (ref 8–16)
AST SERPL-CCNC: 31 U/L (ref 10–40)
BASOPHILS # BLD AUTO: 0.05 K/UL (ref 0–0.2)
BASOPHILS NFR BLD: 1 % (ref 0–1.9)
BILIRUB SERPL-MCNC: 0.4 MG/DL (ref 0.1–1)
BUN SERPL-MCNC: 10 MG/DL (ref 8–23)
CALCIUM SERPL-MCNC: 10.3 MG/DL (ref 8.7–10.5)
CHLORIDE SERPL-SCNC: 104 MMOL/L (ref 95–110)
CO2 SERPL-SCNC: 25 MMOL/L (ref 23–29)
CREAT SERPL-MCNC: 0.8 MG/DL (ref 0.5–1.4)
DIFFERENTIAL METHOD: ABNORMAL
EOSINOPHIL # BLD AUTO: 0.1 K/UL (ref 0–0.5)
EOSINOPHIL NFR BLD: 2.5 % (ref 0–8)
ERYTHROCYTE [DISTWIDTH] IN BLOOD BY AUTOMATED COUNT: 13.2 % (ref 11.5–14.5)
EST. GFR  (NO RACE VARIABLE): >60 ML/MIN/1.73 M^2
GLUCOSE SERPL-MCNC: 118 MG/DL (ref 70–110)
HCT VFR BLD AUTO: 32.5 % (ref 40–54)
HGB BLD-MCNC: 11 G/DL (ref 14–18)
IMM GRANULOCYTES # BLD AUTO: 0.07 K/UL (ref 0–0.04)
IMM GRANULOCYTES NFR BLD AUTO: 1.4 % (ref 0–0.5)
LDH SERPL L TO P-CCNC: 165 U/L (ref 110–260)
LYMPHOCYTES # BLD AUTO: 0.4 K/UL (ref 1–4.8)
LYMPHOCYTES NFR BLD: 8.6 % (ref 18–48)
MCH RBC QN AUTO: 35.1 PG (ref 27–31)
MCHC RBC AUTO-ENTMCNC: 33.8 G/DL (ref 32–36)
MCV RBC AUTO: 104 FL (ref 82–98)
MONOCYTES # BLD AUTO: 0.6 K/UL (ref 0.3–1)
MONOCYTES NFR BLD: 10.9 % (ref 4–15)
NEUTROPHILS # BLD AUTO: 3.9 K/UL (ref 1.8–7.7)
NEUTROPHILS NFR BLD: 75.6 % (ref 38–73)
NRBC BLD-RTO: 0 /100 WBC
PLATELET # BLD AUTO: 143 K/UL (ref 150–450)
PMV BLD AUTO: 10.4 FL (ref 9.2–12.9)
POTASSIUM SERPL-SCNC: 4.5 MMOL/L (ref 3.5–5.1)
PROT SERPL-MCNC: 6.9 G/DL (ref 6–8.4)
RBC # BLD AUTO: 3.13 M/UL (ref 4.6–6.2)
SODIUM SERPL-SCNC: 137 MMOL/L (ref 136–145)
URATE SERPL-MCNC: 5.8 MG/DL (ref 3.4–7)
WBC # BLD AUTO: 5.13 K/UL (ref 3.9–12.7)

## 2023-05-04 PROCEDURE — 99215 PR OFFICE/OUTPT VISIT, EST, LEVL V, 40-54 MIN: ICD-10-PCS | Mod: S$PBB,,, | Performed by: INTERNAL MEDICINE

## 2023-05-04 PROCEDURE — 84550 ASSAY OF BLOOD/URIC ACID: CPT | Performed by: INTERNAL MEDICINE

## 2023-05-04 PROCEDURE — 83615 LACTATE (LD) (LDH) ENZYME: CPT | Performed by: INTERNAL MEDICINE

## 2023-05-04 PROCEDURE — 99999 PR PBB SHADOW E&M-EST. PATIENT-LVL III: CPT | Mod: PBBFAC,,, | Performed by: INTERNAL MEDICINE

## 2023-05-04 PROCEDURE — 85025 COMPLETE CBC W/AUTO DIFF WBC: CPT | Performed by: INTERNAL MEDICINE

## 2023-05-04 PROCEDURE — 99213 OFFICE O/P EST LOW 20 MIN: CPT | Mod: PBBFAC | Performed by: INTERNAL MEDICINE

## 2023-05-04 PROCEDURE — 99215 OFFICE O/P EST HI 40 MIN: CPT | Mod: S$PBB,,, | Performed by: INTERNAL MEDICINE

## 2023-05-04 PROCEDURE — 36415 COLL VENOUS BLD VENIPUNCTURE: CPT | Performed by: INTERNAL MEDICINE

## 2023-05-04 PROCEDURE — 99999 PR PBB SHADOW E&M-EST. PATIENT-LVL III: ICD-10-PCS | Mod: PBBFAC,,, | Performed by: INTERNAL MEDICINE

## 2023-05-04 PROCEDURE — 80053 COMPREHEN METABOLIC PANEL: CPT | Performed by: INTERNAL MEDICINE

## 2023-05-04 RX ORDER — PREDNISONE 50 MG/1
TABLET ORAL
Status: ON HOLD | COMMUNITY
Start: 2023-03-13 | End: 2023-05-20 | Stop reason: HOSPADM

## 2023-05-04 NOTE — PROGRESS NOTES
c  Hematology and Medical Oncology   Follow up     05/04/2023      Primary Hematologic Diagnosis: Mycosis Fungoides      History of Present Ilness:   Randy Armstrong (David) is a pleasant 72 y.o.male who presents today as a self referral. Has received the first 2 cycles of chemo at [facility] and recently relocated to [place] at least temporarily and would like to continue therapy. Currently residing with ex-wife and ex-sister in law.      Hematologic History of his Mycosis fungoides:  --2020: developed rash on legs, initially treated as ringworm with local physician  --9/16/21: Biopsy with local dermatologist with [facility], read at [facility] per below, initially treated with silvadene. Pt did not have insurance so no further work-up was performed.  --10/2021: Started developing tumors, did not see dermatologist again due to insurance issues, they sent rx for halobetasol and valchlor. He was not able to start valchlor due to cost.  --1/2022: Started tanning bed, tumors worsening    --PET on 9/26/22:  1. New and increased lymphadenopathy left axilla, right groin, and right popliteal fossa.  2. New F-18 FDG avid intramuscular mass in the right calf.   3. Increased size and avidity of the subcutaneous left gluteal cleft lesion.   4. Decreased size and avidity of cutaneous lesions at the left eyelid, left upper back, left arm, right lower abdominal wall, bilateral groin, and left foot.   5. Decreased/resolved mildly F-18 FDG avid left cervical lymph nodes.     10/4/2022 - 10/5/2022 TX-Radiation Therapy   Left inner thigh 8Gy in 2 fractions     Reportedly hospitalized the week of 10/24/22 for worsening skin infections and received CHP+ Brentuximab while inpatient.     C1D1: 10/28, C2D1:11/17    He continues to endorse ongoing open wounds to extremities, which he keeps covered with bandages for a week + at a time with a single bandage. + drainage from lesions.    Experienced generalized itching for several days after PET  scan    --Post treatment PET on 3/15/23: Multiple davis and subcutaneous lesions, as well as a right calf intramuscular lesion:  Left axillary node measuring 5.7 x 3.0 cm with SUV max 16 (axial image 114).  Left elbow skin thickening with associated uptake, SUV max 5.9 (axial image 152).  Uptake at the left hand is favored related to injection.  Left gluteal cleft cutaneous lesion SUV max 2.3 (axial image 311).  Left inguinal node 0.8 cm with SUV max 5.9 (axial image 268).  Right inguinal non-hypermetabolic 1.2 cm nodule (CT image 258).  Right distal medial thigh soft tissue nodule 1.2 cm with SUV max 1.7 (leg axial image 75).  Few additional smaller nodules near the popliteal fossa.  Right calf subcutaneous nodule 1.0 cm with SUV max 5.6 (leg axial image 180).  Soft tissue thickening overlying the right gastrocnemius with associated hypermetabolic activity, SUV max 4.4 (leg axial image 163).  This hypermetabolic activity also involves the muscle.   --Received XRT to left axilla 12 Gy in 3 fractions by .    Interval History:  Mr. Armstrong has experienced a normalization from side effects off of therapy. There has been a return of taste to food, he can eat without difficulty.     He has also experienced a rapid regrowth of masses in the last 2 weeks off of therapy. Doesn't feel like left axillary mass has shrunk following radiation therapy. Left groin and left elbow tumors grew sizeably during this time.    Agreeable to return to [facility]      PAST MEDICAL HISTORY:     Diabetes  Gout  Hypertension    PAST SURGICAL HISTORY:   No past surgical history on file.    PAST SOCIAL HISTORY:   reports that he has never smoked. He has never used smokeless tobacco.    FAMILY HISTORY:  No family history on file.    CURRENT MEDICATIONS:   Current Outpatient Medications   Medication Sig    allopurinoL (ZYLOPRIM) 300 MG tablet TAKE 1 TABLET BY MOUTH 2 TIMES DAILY.    apixaban (ELIQUIS) 5 mg Tab Take 1 tablet (5 mg total)  by mouth 2 (two) times daily.    clobetasol 0.05% (TEMOVATE) 0.05 % Oint Apply topically.    famotidine (PEPCID) 20 MG tablet Take 20 mg by mouth.    lisinopriL 10 MG tablet Take 10 mg by mouth.    mupirocin (BACTROBAN) 2 % ointment Apply topically.    ondansetron (ZOFRAN) 8 MG tablet Take 8 mg by mouth.    oxyCODONE (ROXICODONE) 15 MG Tab Take 7.5 mg by mouth.    predniSONE (DELTASONE) 50 MG Tab TAKE 2 TABLETS BY MOUTH DAILY FOR 5 DAYS. TAKE ON DAYS 1 THROUGH 5    prochlorperazine (COMPAZINE) 10 MG tablet Take 10 mg by mouth.    sulfamethoxazole-trimethoprim 800-160mg (BACTRIM DS) 800-160 mg Tab Take 1 tablet by mouth once daily.    triamcinolone acetonide 0.1% (KENALOG) 0.1 % cream Apply topically.    LIDOcaine-prilocaine (EMLA) cream Apply topically as needed (30 minutes prior to port being accessed). (Patient not taking: Reported on 3/29/2023)    LIDOcaine-prilocaine (EMLA) cream Apply to port site 30 minutes prior to charlie (Patient not taking: Reported on 3/29/2023)    valACYclovir (VALTREX) 500 MG tablet Take 1 tablet (500 mg total) by mouth 2 (two) times daily. (Patient not taking: Reported on 3/29/2023)     No current facility-administered medications for this visit.     ALLERGIES:   Review of patient's allergies indicates:   Allergen Reactions    Iodinated contrast media Other (See Comments)     Pt states that he looks sunburnt afterwards    Vancomycin analogues Other (See Comments)     Red man syndrome         Review of Systems:     Review of Systems   Constitutional:  Positive for fatigue. Negative for appetite change, chills, diaphoresis, fever and unexpected weight change.   HENT:   Negative for hearing loss, mouth sores, nosebleeds, sore throat, trouble swallowing and voice change.    Eyes:  Negative for eye problems and icterus.   Respiratory:  Negative for chest tightness, cough, hemoptysis, shortness of breath and wheezing.    Cardiovascular:  Negative for chest pain, leg swelling and palpitations.    Gastrointestinal:  Negative for abdominal distention, abdominal pain, blood in stool, diarrhea, nausea and vomiting.   Endocrine: Negative for hot flashes.   Genitourinary:  Negative for bladder incontinence, difficulty urinating, dysuria and hematuria.    Musculoskeletal:  Negative for arthralgias, back pain, flank pain, gait problem, myalgias, neck pain and neck stiffness.   Skin:  Positive for itching and wound. Negative for rash.   Neurological:  Negative for dizziness, extremity weakness, gait problem, headaches, numbness, seizures and speech difficulty.   Hematological:  Negative for adenopathy. Does not bruise/bleed easily.   Psychiatric/Behavioral:  Negative for confusion, depression and sleep disturbance. The patient is not nervous/anxious.         Physical Exam:     Vitals:    05/04/23 0857   BP: (!) 147/75   Pulse: 68   Resp: 18   Temp: 98.6 °F (37 °C)       Physical Exam  Constitutional:       General: He is not in acute distress.     Appearance: He is well-developed. He is not diaphoretic.   HENT:      Head: Normocephalic and atraumatic.      Mouth/Throat:      Pharynx: No oropharyngeal exudate.   Eyes:      Conjunctiva/sclera: Conjunctivae normal.      Pupils: Pupils are equal, round, and reactive to light.   Neck:      Thyroid: No thyromegaly.      Vascular: No JVD.      Trachea: No tracheal deviation.   Cardiovascular:      Rate and Rhythm: Normal rate and regular rhythm.      Heart sounds: Normal heart sounds. No murmur heard.    No friction rub.   Pulmonary:      Effort: Pulmonary effort is normal. No respiratory distress.      Breath sounds: Normal breath sounds. No stridor. No wheezing or rales.   Chest:      Chest wall: No tenderness.   Abdominal:      General: Bowel sounds are normal. There is no distension.      Palpations: Abdomen is soft.      Tenderness: There is no abdominal tenderness. There is no guarding or rebound.   Musculoskeletal:         General: No tenderness or deformity.  Normal range of motion.      Cervical back: Normal range of motion and neck supple.      Comments: + axillary mass.    Skin:     General: Skin is warm and dry.      Capillary Refill: Capillary refill takes less than 2 seconds.      Coloration: Skin is not pale.      Findings: No erythema, lesion or rash.   Neurological:      Mental Status: He is alert and oriented to person, place, and time.      Cranial Nerves: No cranial nerve deficit.      Sensory: No sensory deficit.      Motor: No abnormal muscle tone.      Coordination: Coordination normal.      Deep Tendon Reflexes: Reflexes normal.   Psychiatric:         Behavior: Behavior normal.         Thought Content: Thought content normal.         Judgment: Judgment normal.       ECOG Performance Status: (foot note - ECOG PS provided by Eastern Cooperative Oncology Group) 0 - Asymptomatic    Karnofsky Performance Score:  100%- Normal, No Complaints, No Evidence of Disease    Labs:   Lab Results   Component Value Date    WBC 5.13 05/04/2023    HGB 11.0 (L) 05/04/2023    HCT 32.5 (L) 05/04/2023     (L) 05/04/2023    TRIG 329 (H) 10/19/2022    ALT 30 05/04/2023    AST 31 05/04/2023     05/04/2023    K 4.5 05/04/2023     05/04/2023    CREATININE 0.8 05/04/2023    BUN 10 05/04/2023    CO2 25 05/04/2023    INR 1.1 01/05/2023    HGBA1C 6.0 (H) 10/26/2022         Imaging: Previous imaging has been reviewed     Assessment and Plan:     Mr. 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   --End of treatment PET shows ongoing active disease  --Received palliative radiation to left axillary masss  --Given rapid progression of lymphoma off  therapy, I encouraged  to return to [facility] for an end of treatment visit/next steps visit. With persistent disease following 6 cycles of BV-CHP I consider this refractory disease  --He plans to make a follow up visit at [facility] and will keep us informed      DVT  --On eliquis    Hypertension  --Currently taking lisinopril  --Poorly controlled based on our reads  --Will add additional agent  --Referral to PCP        30 minutes were spent face to face with the patient to discuss the disease, natural history, treatment options and survival statistics. I have provided the patient with an opportunity to ask questions and have all questions answered to his satisfaction.       he will return to clinic as needed following return from [facility], but knows to call in the interim if symptoms change or should a problem arise.        Teodora Rashid MD  Hematology and Medical Oncology  Bone Marrow Transplant  [facility]      BMT Chart Routing      Follow up with physician Other. Will reach out to us when he returns from [facility]   Follow up with [facility]    Provider visit type    Infusion scheduling note    Injection scheduling note    Labs    Imaging    Pharmacy appointment    Other referrals

## 2023-05-17 ENCOUNTER — PATIENT MESSAGE (OUTPATIENT)
Dept: HEMATOLOGY/ONCOLOGY | Facility: CLINIC | Age: 73
End: 2023-05-17
Payer: MEDICARE

## 2023-05-17 NOTE — TELEPHONE ENCOUNTER
Spoke with patient regarding picture and symptoms. Denies fever. Pt agrees it is getting worse. Discussed with Dr. Rashid and ID doctor. Advised er.

## 2023-05-18 ENCOUNTER — HOSPITAL ENCOUNTER (INPATIENT)
Facility: HOSPITAL | Age: 73
LOS: 1 days | Discharge: HOME | DRG: 841 | End: 2023-05-20
Attending: EMERGENCY MEDICINE | Admitting: FAMILY MEDICINE
Payer: MEDICARE

## 2023-05-18 ENCOUNTER — PATIENT MESSAGE (OUTPATIENT)
Dept: ADMINISTRATIVE | Facility: HOSPITAL | Age: 73
End: 2023-05-18
Payer: MEDICARE

## 2023-05-18 ENCOUNTER — PATIENT OUTREACH (OUTPATIENT)
Dept: ADMINISTRATIVE | Facility: HOSPITAL | Age: 73
End: 2023-05-18
Payer: MEDICARE

## 2023-05-18 DIAGNOSIS — T14.8XXD WOUND HEALING, DELAYED: ICD-10-CM

## 2023-05-18 DIAGNOSIS — C85.90 LYMPHOMA, UNSPECIFIED BODY REGION, UNSPECIFIED LYMPHOMA TYPE: Primary | ICD-10-CM

## 2023-05-18 DIAGNOSIS — D70.1 CHEMOTHERAPY-INDUCED NEUTROPENIA: ICD-10-CM

## 2023-05-18 DIAGNOSIS — T45.1X5A CHEMOTHERAPY-INDUCED NEUTROPENIA: ICD-10-CM

## 2023-05-18 DIAGNOSIS — S51.002A: ICD-10-CM

## 2023-05-18 DIAGNOSIS — C84.44 PERIPHERAL T CELL LYMPHOMA OF AXILLARY LYMPH NODES: ICD-10-CM

## 2023-05-18 DIAGNOSIS — R07.9 CHEST PAIN: ICD-10-CM

## 2023-05-18 DIAGNOSIS — I10 HYPERTENSION, UNSPECIFIED TYPE: ICD-10-CM

## 2023-05-18 PROBLEM — I82.401 ACUTE DEEP VEIN THROMBOSIS (DVT) OF RIGHT LOWER EXTREMITY: Status: ACTIVE | Noted: 2023-05-18

## 2023-05-18 LAB
ALBUMIN SERPL BCP-MCNC: 3.6 G/DL (ref 3.5–5.2)
ALP SERPL-CCNC: 73 U/L (ref 55–135)
ALT SERPL W/O P-5'-P-CCNC: 36 U/L (ref 10–44)
ANION GAP SERPL CALC-SCNC: 10 MMOL/L (ref 8–16)
AST SERPL-CCNC: 34 U/L (ref 10–40)
BASOPHILS # BLD AUTO: 0.02 K/UL (ref 0–0.2)
BASOPHILS NFR BLD: 0.4 % (ref 0–1.9)
BILIRUB SERPL-MCNC: 0.4 MG/DL (ref 0.1–1)
BUN SERPL-MCNC: 11 MG/DL (ref 8–23)
CALCIUM SERPL-MCNC: 9.7 MG/DL (ref 8.7–10.5)
CHLORIDE SERPL-SCNC: 105 MMOL/L (ref 95–110)
CO2 SERPL-SCNC: 22 MMOL/L (ref 23–29)
CREAT SERPL-MCNC: 1 MG/DL (ref 0.5–1.4)
CRP SERPL-MCNC: 34.6 MG/L (ref 0–8.2)
DIFFERENTIAL METHOD: ABNORMAL
EOSINOPHIL # BLD AUTO: 0.1 K/UL (ref 0–0.5)
EOSINOPHIL NFR BLD: 2.1 % (ref 0–8)
ERYTHROCYTE [DISTWIDTH] IN BLOOD BY AUTOMATED COUNT: 13.1 % (ref 11.5–14.5)
EST. GFR  (NO RACE VARIABLE): >60 ML/MIN/1.73 M^2
ESTIMATED AVG GLUCOSE: 94 MG/DL (ref 68–131)
FIO2: 21 %
GLUCOSE SERPL-MCNC: 133 MG/DL (ref 70–110)
HBA1C MFR BLD: 4.9 % (ref 4–5.6)
HCT VFR BLD AUTO: 33.8 % (ref 40–54)
HGB BLD-MCNC: 11.3 G/DL (ref 14–18)
IMM GRANULOCYTES # BLD AUTO: 0.01 K/UL (ref 0–0.04)
IMM GRANULOCYTES NFR BLD AUTO: 0.2 % (ref 0–0.5)
LACTATE SERPL-SCNC: 1.2 MMOL/L (ref 0.5–2.2)
LACTATE SERPL-SCNC: 1.4 MMOL/L (ref 0.5–2.2)
LDH SERPL L TO P-CCNC: 1 MMOL/L (ref 0.5–2.2)
LYMPHOCYTES # BLD AUTO: 0.4 K/UL (ref 1–4.8)
LYMPHOCYTES NFR BLD: 8.5 % (ref 18–48)
MAGNESIUM SERPL-MCNC: 1.9 MG/DL (ref 1.6–2.6)
MCH RBC QN AUTO: 35 PG (ref 27–31)
MCHC RBC AUTO-ENTMCNC: 33.4 G/DL (ref 32–36)
MCV RBC AUTO: 105 FL (ref 82–98)
MONOCYTES # BLD AUTO: 0.4 K/UL (ref 0.3–1)
MONOCYTES NFR BLD: 9.2 % (ref 4–15)
NEUTROPHILS # BLD AUTO: 3.8 K/UL (ref 1.8–7.7)
NEUTROPHILS NFR BLD: 79.6 % (ref 38–73)
NRBC BLD-RTO: 0 /100 WBC
PHOSPHATE SERPL-MCNC: 4.4 MG/DL (ref 2.7–4.5)
PLATELET # BLD AUTO: 142 K/UL (ref 150–450)
PMV BLD AUTO: 10.4 FL (ref 9.2–12.9)
POC PERFORMED BY: NORMAL
POCT GLUCOSE: 146 MG/DL (ref 70–110)
POCT GLUCOSE: 79 MG/DL (ref 70–110)
POTASSIUM SERPL-SCNC: 4.5 MMOL/L (ref 3.5–5.1)
PROT SERPL-MCNC: 6.7 G/DL (ref 6–8.4)
RBC # BLD AUTO: 3.23 M/UL (ref 4.6–6.2)
SODIUM SERPL-SCNC: 137 MMOL/L (ref 136–145)
SPECIMEN SOURCE: NORMAL
WBC # BLD AUTO: 4.8 K/UL (ref 3.9–12.7)

## 2023-05-18 PROCEDURE — 99900035 HC TECH TIME PER 15 MIN (STAT)

## 2023-05-18 PROCEDURE — 86140 C-REACTIVE PROTEIN: CPT | Performed by: NURSE PRACTITIONER

## 2023-05-18 PROCEDURE — 84100 ASSAY OF PHOSPHORUS: CPT | Performed by: PHYSICIAN ASSISTANT

## 2023-05-18 PROCEDURE — 83735 ASSAY OF MAGNESIUM: CPT | Performed by: PHYSICIAN ASSISTANT

## 2023-05-18 PROCEDURE — 85610 PROTHROMBIN TIME: CPT

## 2023-05-18 PROCEDURE — 63600175 PHARM REV CODE 636 W HCPCS: Performed by: PHYSICIAN ASSISTANT

## 2023-05-18 PROCEDURE — 99204 PR OFFICE/OUTPT VISIT, NEW, LEVL IV, 45-59 MIN: ICD-10-PCS | Mod: ,,, | Performed by: ORTHOPAEDIC SURGERY

## 2023-05-18 PROCEDURE — 96365 THER/PROPH/DIAG IV INF INIT: CPT | Mod: 59

## 2023-05-18 PROCEDURE — 83036 HEMOGLOBIN GLYCOSYLATED A1C: CPT | Performed by: NURSE PRACTITIONER

## 2023-05-18 PROCEDURE — 96367 TX/PROPH/DG ADDL SEQ IV INF: CPT

## 2023-05-18 PROCEDURE — G0378 HOSPITAL OBSERVATION PER HR: HCPCS

## 2023-05-18 PROCEDURE — 63600175 PHARM REV CODE 636 W HCPCS: Performed by: NURSE PRACTITIONER

## 2023-05-18 PROCEDURE — 25000003 PHARM REV CODE 250: Performed by: PHYSICIAN ASSISTANT

## 2023-05-18 PROCEDURE — 25000003 PHARM REV CODE 250: Performed by: NURSE PRACTITIONER

## 2023-05-18 PROCEDURE — 99285 EMERGENCY DEPT VISIT HI MDM: CPT | Mod: 25

## 2023-05-18 PROCEDURE — 25000003 PHARM REV CODE 250: Performed by: INTERNAL MEDICINE

## 2023-05-18 PROCEDURE — 80053 COMPREHEN METABOLIC PANEL: CPT | Performed by: PHYSICIAN ASSISTANT

## 2023-05-18 PROCEDURE — 99214 OFFICE O/P EST MOD 30 MIN: CPT | Mod: ,,, | Performed by: INTERNAL MEDICINE

## 2023-05-18 PROCEDURE — 99204 OFFICE O/P NEW MOD 45 MIN: CPT | Mod: ,,, | Performed by: ORTHOPAEDIC SURGERY

## 2023-05-18 PROCEDURE — 82803 BLOOD GASES ANY COMBINATION: CPT

## 2023-05-18 PROCEDURE — 83605 ASSAY OF LACTIC ACID: CPT | Performed by: PHYSICIAN ASSISTANT

## 2023-05-18 PROCEDURE — 93005 ELECTROCARDIOGRAM TRACING: CPT

## 2023-05-18 PROCEDURE — 93010 EKG 12-LEAD: ICD-10-PCS | Mod: ,,, | Performed by: INTERNAL MEDICINE

## 2023-05-18 PROCEDURE — 99214 PR OFFICE/OUTPT VISIT, EST, LEVL IV, 30-39 MIN: ICD-10-PCS | Mod: ,,, | Performed by: INTERNAL MEDICINE

## 2023-05-18 PROCEDURE — 63600175 PHARM REV CODE 636 W HCPCS: Performed by: INTERNAL MEDICINE

## 2023-05-18 PROCEDURE — 85025 COMPLETE CBC W/AUTO DIFF WBC: CPT | Performed by: PHYSICIAN ASSISTANT

## 2023-05-18 PROCEDURE — 82565 ASSAY OF CREATININE: CPT

## 2023-05-18 PROCEDURE — 93010 ELECTROCARDIOGRAM REPORT: CPT | Mod: ,,, | Performed by: INTERNAL MEDICINE

## 2023-05-18 PROCEDURE — 87040 BLOOD CULTURE FOR BACTERIA: CPT | Mod: 59 | Performed by: PHYSICIAN ASSISTANT

## 2023-05-18 PROCEDURE — 94761 N-INVAS EAR/PLS OXIMETRY MLT: CPT

## 2023-05-18 PROCEDURE — 96372 THER/PROPH/DIAG INJ SC/IM: CPT | Mod: 59 | Performed by: NURSE PRACTITIONER

## 2023-05-18 RX ORDER — VALACYCLOVIR HYDROCHLORIDE 500 MG/1
500 TABLET, FILM COATED ORAL 2 TIMES DAILY
Status: DISCONTINUED | OUTPATIENT
Start: 2023-05-18 | End: 2023-05-20 | Stop reason: HOSPADM

## 2023-05-18 RX ORDER — ONDANSETRON 2 MG/ML
4 INJECTION INTRAMUSCULAR; INTRAVENOUS EVERY 8 HOURS PRN
Status: DISCONTINUED | OUTPATIENT
Start: 2023-05-18 | End: 2023-05-20 | Stop reason: HOSPADM

## 2023-05-18 RX ORDER — ALLOPURINOL 100 MG/1
300 TABLET ORAL 2 TIMES DAILY
Status: DISCONTINUED | OUTPATIENT
Start: 2023-05-18 | End: 2023-05-20 | Stop reason: HOSPADM

## 2023-05-18 RX ORDER — HEPARIN SODIUM 5000 [USP'U]/ML
5000 INJECTION, SOLUTION INTRAVENOUS; SUBCUTANEOUS EVERY 8 HOURS
Status: DISCONTINUED | OUTPATIENT
Start: 2023-05-18 | End: 2023-05-19

## 2023-05-18 RX ORDER — IBUPROFEN 200 MG
600 TABLET ORAL
COMMUNITY

## 2023-05-18 RX ORDER — CEFAZOLIN SODIUM 2 G/50ML
2 SOLUTION INTRAVENOUS
Status: DISCONTINUED | OUTPATIENT
Start: 2023-05-18 | End: 2023-05-20 | Stop reason: HOSPADM

## 2023-05-18 RX ORDER — DOXYCYCLINE HYCLATE 100 MG
100 TABLET ORAL EVERY 12 HOURS
Status: DISCONTINUED | OUTPATIENT
Start: 2023-05-18 | End: 2023-05-20 | Stop reason: HOSPADM

## 2023-05-18 RX ORDER — GLUCAGON 1 MG
1 KIT INJECTION
Status: DISCONTINUED | OUTPATIENT
Start: 2023-05-18 | End: 2023-05-20 | Stop reason: HOSPADM

## 2023-05-18 RX ORDER — ACETAMINOPHEN 325 MG/1
650 TABLET ORAL EVERY 8 HOURS PRN
Status: DISCONTINUED | OUTPATIENT
Start: 2023-05-18 | End: 2023-05-20 | Stop reason: HOSPADM

## 2023-05-18 RX ORDER — INSULIN ASPART 100 [IU]/ML
0-5 INJECTION, SOLUTION INTRAVENOUS; SUBCUTANEOUS EVERY 6 HOURS PRN
Status: DISCONTINUED | OUTPATIENT
Start: 2023-05-18 | End: 2023-05-20 | Stop reason: HOSPADM

## 2023-05-18 RX ORDER — HYDROCODONE BITARTRATE AND ACETAMINOPHEN 5; 325 MG/1; MG/1
1 TABLET ORAL EVERY 6 HOURS PRN
Status: DISCONTINUED | OUTPATIENT
Start: 2023-05-18 | End: 2023-05-19

## 2023-05-18 RX ORDER — SODIUM CHLORIDE 0.9 % (FLUSH) 0.9 %
10 SYRINGE (ML) INJECTION EVERY 12 HOURS PRN
Status: DISCONTINUED | OUTPATIENT
Start: 2023-05-18 | End: 2023-05-20 | Stop reason: HOSPADM

## 2023-05-18 RX ORDER — NALOXONE HCL 0.4 MG/ML
0.02 VIAL (ML) INJECTION
Status: DISCONTINUED | OUTPATIENT
Start: 2023-05-18 | End: 2023-05-20 | Stop reason: HOSPADM

## 2023-05-18 RX ADMIN — SODIUM CHLORIDE, POTASSIUM CHLORIDE, SODIUM LACTATE AND CALCIUM CHLORIDE 1000 ML: 600; 310; 30; 20 INJECTION, SOLUTION INTRAVENOUS at 10:05

## 2023-05-18 RX ADMIN — HEPARIN SODIUM 5000 UNITS: 5000 INJECTION INTRAVENOUS; SUBCUTANEOUS at 03:05

## 2023-05-18 RX ADMIN — CEFEPIME 2 G: 2 INJECTION, POWDER, FOR SOLUTION INTRAVENOUS at 11:05

## 2023-05-18 RX ADMIN — HYDROCODONE BITARTRATE AND ACETAMINOPHEN 1 TABLET: 5; 325 TABLET ORAL at 12:05

## 2023-05-18 RX ADMIN — ALLOPURINOL 300 MG: 100 TABLET ORAL at 01:05

## 2023-05-18 RX ADMIN — ALLOPURINOL 300 MG: 100 TABLET ORAL at 08:05

## 2023-05-18 RX ADMIN — PIPERACILLIN AND TAZOBACTAM 4.5 G: 4; .5 INJECTION, POWDER, LYOPHILIZED, FOR SOLUTION INTRAVENOUS; PARENTERAL at 10:05

## 2023-05-18 RX ADMIN — HEPARIN SODIUM 5000 UNITS: 5000 INJECTION INTRAVENOUS; SUBCUTANEOUS at 09:05

## 2023-05-18 RX ADMIN — HYDROCODONE BITARTRATE AND ACETAMINOPHEN 1 TABLET: 5; 325 TABLET ORAL at 08:05

## 2023-05-18 RX ADMIN — VALACYCLOVIR HYDROCHLORIDE 500 MG: 500 TABLET, FILM COATED ORAL at 08:05

## 2023-05-18 RX ADMIN — DOXYCYCLINE HYCLATE 100 MG: 100 TABLET, COATED ORAL at 08:05

## 2023-05-18 RX ADMIN — CEFAZOLIN SODIUM 2 G: 2 SOLUTION INTRAVENOUS at 08:05

## 2023-05-18 NOTE — Clinical Note
Diagnosis: Open wound of left elbow [[id number]]   Future Attending Provider: JAMI PEDRO [[id number]]   Is the patient being sent to ED Observation?: No   Admitting Provider:: JAMI PEDRO [[id number]]

## 2023-05-18 NOTE — ASSESSMENT & PLAN NOTE
No evidence of acute fracture or dislocation.  Minor enthesopathic change about the elbow joint.  No large elbow joint effusion is seen.     Skin ulceration is noted of the dorsal and ulnar aspect of the arm, in keeping with known mass lesion in this location.  Appears to be overlying bandage material.  Nonhealing necrotic wound to the left elbow.  Site of tumor necrosis.  Patient has taken multiple courses of p.o. Bactrim  Wound continues to grow in size and is nonhealing  Blood cultures pending  Check ESR, CRP  Patient is nonseptic appearing  Continue IV cefepime  History allergy to vancomycin with red man syndrome  Consult ID, Hematology, orthopedic surgery  Pain control

## 2023-05-18 NOTE — SUBJECTIVE & OBJECTIVE
- he does not see improvement in his mycosis fungoides after chemotherapy (cyclophosphamide, doxorubicin, prednisone+brentuximab).     - he endorses fatigue, worsened left elbow wound, calf pain, left axillary pain.      Oncology Treatment Plan:   [No matching plan found]    Medications:  Continuous Infusions:  Scheduled Meds:   allopurinoL  300 mg Oral BID    ceFEPime (MAXIPIME) IVPB  2 g Intravenous Q8H    heparin (porcine)  5,000 Units Subcutaneous Q8H    valACYclovir  500 mg Oral BID     PRN Meds:acetaminophen, dextrose 10%, dextrose 10%, glucagon (human recombinant), HYDROcodone-acetaminophen, insulin aspart U-100, naloxone, ondansetron, sodium chloride 0.9%     Review of patient's allergies indicates:   Allergen Reactions    Iodinated contrast media Other (See Comments)     Pt states that he looks sunburnt afterwards    Vancomycin analogues Other (See Comments)     Red man syndrome        Past Medical History:   Diagnosis Date    Cancer      No past surgical history on file.  Family History    None       Tobacco Use    Smoking status: Never    Smokeless tobacco: Never   Substance and Sexual Activity    Alcohol use: Not on file    Drug use: Not on file    Sexual activity: Not on file       Review of Systems   Constitutional:  Positive for fatigue.   HENT:  Negative for sore throat.    Eyes:  Negative for visual disturbance.   Respiratory:  Negative for shortness of breath.    Cardiovascular:  Negative for chest pain.   Gastrointestinal:  Negative for abdominal pain.   Genitourinary:  Negative for dysuria.   Musculoskeletal:  Positive for arthralgias. Negative for back pain.        Left arm pain, calf pain   Skin:  Negative for rash.        Left elbow lesion, back lesion, calf lesion   Neurological:  Negative for headaches.   Hematological:  Negative for adenopathy.   Psychiatric/Behavioral:  The patient is not nervous/anxious.    Objective:     Vital Signs (Most Recent):  Temp: 99.2 °F (37.3 °C) (05/18/23  0914)  Pulse: 76 (05/18/23 1259)  Resp: 18 (05/18/23 1253)  BP: (!) 154/74 (05/18/23 1004)  SpO2: 95 % (05/18/23 1259) Vital Signs (24h Range):  Temp:  [99.2 °F (37.3 °C)] 99.2 °F (37.3 °C)  Pulse:  [69-86] 76  Resp:  [14-18] 18  SpO2:  [95 %-98 %] 95 %  BP: (124-170)/(65-74) 154/74     Weight: 108.9 kg (240 lb)  Body mass index is 31.66 kg/m².  Body surface area is 2.37 meters squared.    No intake or output data in the 24 hours ending 05/18/23 1303     Physical Exam  Vitals and nursing note reviewed.   Constitutional:       Appearance: He is well-developed.   HENT:      Head: Normocephalic and atraumatic.   Eyes:      Pupils: Pupils are equal, round, and reactive to light.   Cardiovascular:      Rate and Rhythm: Normal rate and regular rhythm.   Pulmonary:      Effort: Pulmonary effort is normal.      Breath sounds: Normal breath sounds.   Abdominal:      General: Bowel sounds are normal.      Palpations: Abdomen is soft.   Musculoskeletal:         General: Normal range of motion.      Cervical back: Normal range of motion and neck supple.   Skin:     General: Skin is warm and dry.      Comments: Large wound over left elbow (see picture). Plaques noted on back, chest, leg.   Neurological:      Mental Status: He is alert and oriented to person, place, and time.   Psychiatric:         Behavior: Behavior normal.         Thought Content: Thought content normal.         Judgment: Judgment normal.          Significant Labs:   CBC:   Recent Labs   Lab 05/18/23  0935   WBC 4.80   HGB 11.3*   HCT 33.8*   *    and CMP:   Recent Labs   Lab 05/18/23  0935      K 4.5      CO2 22*   *   BUN 11   CREATININE 1.0   CALCIUM 9.7   PROT 6.7   ALBUMIN 3.6   BILITOT 0.4   ALKPHOS 73   AST 34   ALT 36   ANIONGAP 10

## 2023-05-18 NOTE — HPI
71 yo male with a PMH of Peripheral T cell lymphoma- status post radiation, chemotherapy follows [facility], cellulitis, HTN, Mycosis fungoides, gout, [facility] presents with nonhealing left elbow necrotic wound worse for the past 2 weeks- with pain feeling like bee-stings, rated 7/10, foul smell.  He was diagnosed with T-cell lymphoma August of 2022.  The wound started around the size of an acorn at this time and then gradually increased in size since.  He has taken Bactrim multiple times over the past year, and is currently taking this regimen for the wound.  He has a left axilla tumor which has received radiation July 2022.  He also has a right lower extremity tumor and DVT for which he is on Eliquis.  Is currently taking acyclovir for shingles prevention.  He denies headache, vision changes, dizziness, lightheadedness, chest pain, shortness of breath, abdominal pain, nausea, vomiting, diarrhea, fever, chill, cough, urinary changes, alcohol, tobacco, or illicit drugs.

## 2023-05-18 NOTE — HPI
72 year-old male with mycosis fungoides was admitted on 5/18/23 for necrotic left elbow wound. Consult is for mycosis fungoides.

## 2023-05-18 NOTE — SUBJECTIVE & OBJECTIVE
Past Medical History:   Diagnosis Date    Cancer        No past surgical history on file.    Review of patient's allergies indicates:   Allergen Reactions    Iodinated contrast media Other (See Comments)     Pt states that he looks sunburnt afterwards    Vancomycin analogues Other (See Comments)     Red man syndrome       No current facility-administered medications on file prior to encounter.     Current Outpatient Medications on File Prior to Encounter   Medication Sig    allopurinoL (ZYLOPRIM) 300 MG tablet TAKE 1 TABLET BY MOUTH 2 TIMES DAILY. (Patient taking differently: Take 300 mg by mouth 2 (two) times a day.)    apixaban (ELIQUIS) 5 mg Tab Take 1 tablet (5 mg total) by mouth 2 (two) times daily.    clobetasol 0.05% (TEMOVATE) 0.05 % Oint Apply topically.    ibuprofen (ADVIL,MOTRIN) 200 MG tablet Take 600 mg by mouth as needed for Pain.    mupirocin (BACTROBAN) 2 % ointment Apply topically.    ondansetron (ZOFRAN) 8 MG tablet Take 8 mg by mouth.    oxyCODONE (ROXICODONE) 15 MG Tab Take 7.5 mg by mouth.    prochlorperazine (COMPAZINE) 10 MG tablet Take 10 mg by mouth.    sulfamethoxazole-trimethoprim 800-160mg (BACTRIM DS) 800-160 mg Tab Take 1 tablet by mouth once daily.    triamcinolone acetonide 0.1% (KENALOG) 0.1 % cream Apply topically.    valACYclovir (VALTREX) 500 MG tablet Take 1 tablet (500 mg total) by mouth 2 (two) times daily.    predniSONE (DELTASONE) 50 MG Tab TAKE 2 TABLETS BY MOUTH DAILY FOR 5 DAYS. TAKE ON DAYS 1 THROUGH 5    [DISCONTINUED] famotidine (PEPCID) 20 MG tablet Take 20 mg by mouth.    [DISCONTINUED] LIDOcaine-prilocaine (EMLA) cream Apply topically as needed (30 minutes prior to port being accessed). (Patient not taking: Reported on 3/29/2023)    [DISCONTINUED] LIDOcaine-prilocaine (EMLA) cream Apply to port site 30 minutes prior to charlie (Patient not taking: Reported on 3/29/2023)    [DISCONTINUED] lisinopriL 10 MG tablet Take 10 mg by mouth.     Family History    None        Tobacco Use    Smoking status: Never    Smokeless tobacco: Never   Substance and Sexual Activity    Alcohol use: Not on file    Drug use: Not on file    Sexual activity: Not on file     Review of Systems  Objective:     Vital Signs (Most Recent):  Temp: 99.2 °F (37.3 °C) (05/18/23 0914)  Pulse: 76 (05/18/23 1259)  Resp: 18 (05/18/23 1253)  BP: (!) 154/74 (05/18/23 1004)  SpO2: 95 % (05/18/23 1259) Vital Signs (24h Range):  Temp:  [99.2 °F (37.3 °C)] 99.2 °F (37.3 °C)  Pulse:  [69-86] 76  Resp:  [14-18] 18  SpO2:  [95 %-98 %] 95 %  BP: (124-170)/(65-74) 154/74     Weight: 108.9 kg (240 lb)  Body mass index is 31.66 kg/m².     Physical Exam  Constitutional:       Appearance: Normal appearance.   HENT:      Head: Normocephalic and atraumatic.      Nose: Nose normal.      Mouth/Throat:      Mouth: Mucous membranes are moist.      Pharynx: Oropharynx is clear.   Eyes:      Extraocular Movements: Extraocular movements intact.      Conjunctiva/sclera: Conjunctivae normal.   Cardiovascular:      Rate and Rhythm: Normal rate and regular rhythm.      Pulses: Normal pulses.      Heart sounds: Normal heart sounds.   Pulmonary:      Effort: Pulmonary effort is normal.      Breath sounds: Normal breath sounds.   Abdominal:      General: Abdomen is flat. Bowel sounds are normal.      Palpations: Abdomen is soft.   Musculoskeletal:         General: Normal range of motion.      Cervical back: Normal range of motion and neck supple.   Skin:     General: Skin is warm and dry.      Comments: Left axilla tumor with erythema    Left elbow necrotic wound please see clinical image    Right lower extremity edematous related to tumor and known DVT   Neurological:      General: No focal deficit present.      Mental Status: He is alert and oriented to person, place, and time.                    Significant Labs: All pertinent labs within the past 24 hours have been reviewed.    Significant Imaging: I have reviewed all pertinent imaging  results/findings within the past 24 hours.

## 2023-05-18 NOTE — ASSESSMENT & PLAN NOTE
"- patient of Dr. Rashid and [facility]  - at last visit with Dr. Rashid on 5/4/23, Dr. Rashid wrote:  "--Given rapid progression of lymphoma off therapy, I encouraged  to return to [facility] for an end of treatment visit/next steps visit. With persistent disease following 6 cycles of BV-CHP I consider this refractory disease"  - he is scheduled to go to [facility] in early June  - I will update Dr. Rashid about his current admission  - recommend infectious disease and surgery consults for likely infected left arm wound.  "

## 2023-05-18 NOTE — CONSULTS
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 Consult is for mycosis fungoides.      - he does not see improvement in his mycosis fungoides after chemotherapy (cyclophosphamide, doxorubicin, prednisone+brentuximab).     - he endorses fatigue, worsened left elbow wound, calf pain, left axillary pain.      Oncology Treatment Plan:   [No matching plan found]    Medications:  Continuous Infusions:  Scheduled Meds:   allopurinoL  300 mg Oral BID    ceFEPime (MAXIPIME) IVPB  2 g Intravenous Q8H    heparin (porcine)  5,000 Units Subcutaneous Q8H    valACYclovir  500 mg Oral BID     PRN Meds:acetaminophen, dextrose 10%, dextrose 10%, glucagon (human recombinant), HYDROcodone-acetaminophen, insulin aspart U-100, naloxone, ondansetron, sodium chloride 0.9%     Review of patient's allergies indicates:   Allergen Reactions    Iodinated contrast media Other (See Comments)     Pt states that he looks sunburnt afterwards    Vancomycin analogues Other (See Comments)     Red man syndrome        Past Medical History:   Diagnosis Date    Cancer      No past surgical history on file.  Family History    None       Tobacco Use    Smoking status: Never    Smokeless tobacco: Never   Substance and Sexual Activity    Alcohol use: Not on file    Drug use: Not on file    Sexual activity: Not on file       Review of  Systems   Constitutional:  Positive for fatigue.   HENT:  Negative for sore throat.    Eyes:  Negative for visual disturbance.   Respiratory:  Negative for shortness of breath.    Cardiovascular:  Negative for chest pain.   Gastrointestinal:  Negative for abdominal pain.   Genitourinary:  Negative for dysuria.   Musculoskeletal:  Positive for arthralgias. Negative for back pain.        Left arm pain, calf pain   Skin:  Negative for rash.        Left elbow lesion, back lesion, calf lesion   Neurological:  Negative for headaches.   Hematological:  Negative for adenopathy.   Psychiatric/Behavioral:  The patient is not nervous/anxious.    Objective:     Vital Signs (Most Recent):  Temp: 99.2 °F (37.3 °C) (05/18/23 0914)  Pulse: 76 (05/18/23 1259)  Resp: 18 (05/18/23 1253)  BP: (!) 154/74 (05/18/23 1004)  SpO2: 95 % (05/18/23 1259) Vital Signs (24h Range):  Temp:  [99.2 °F (37.3 °C)] 99.2 °F (37.3 °C)  Pulse:  [69-86] 76  Resp:  [14-18] 18  SpO2:  [95 %-98 %] 95 %  BP: (124-170)/(65-74) 154/74     Weight: 108.9 kg (240 lb)  Body mass index is 31.66 kg/m².  Body surface area is 2.37 meters squared.    No intake or output data in the 24 hours ending 05/18/23 1303     Physical Exam  Vitals and nursing note reviewed.   Constitutional:       Appearance: He is well-developed.   HENT:      Head: Normocephalic and atraumatic.   Eyes:      Pupils: Pupils are equal, round, and reactive to light.   Cardiovascular:      Rate and Rhythm: Normal rate and regular rhythm.   Pulmonary:      Effort: Pulmonary effort is normal.      Breath sounds: Normal breath sounds.   Abdominal:      General: Bowel sounds are normal.      Palpations: Abdomen is soft.   Musculoskeletal:         General: Normal range of motion.      Cervical back: Normal range of motion and neck supple.   Skin:     General: Skin is warm and dry.      Comments: Large wound over left elbow (see picture). Plaques noted on back, chest, leg.   Neurological:      Mental Status:  "He is alert and oriented to person, place, and time.   Psychiatric:         Behavior: Behavior normal.         Thought Content: Thought content normal.         Judgment: Judgment normal.          Significant Labs:   CBC:   Recent Labs   Lab 05/18/23  0935   WBC 4.80   HGB 11.3*   HCT 33.8*   *    and CMP:   Recent Labs   Lab 05/18/23  0935      K 4.5      CO2 22*   *   BUN 11   CREATININE 1.0   CALCIUM 9.7   PROT 6.7   ALBUMIN 3.6   BILITOT 0.4   ALKPHOS 73   AST 34   ALT 36   ANIONGAP 10         Assessment/Plan:     Mycosis fungoides  - patient of Dr. Rashid and [facility]  - at last visit with Dr. Rashid on 5/4/23, Dr. Rashid wrote:  "--Given rapid progression of lymphoma off therapy, I encouraged  to return to [facility] for an end of treatment visit/next steps visit. With persistent disease following 6 cycles of BV-CHP I consider this refractory disease"  - he is scheduled to go to [facility] in early June  - I will update Dr. Rashid about his current admission  - recommend infectious disease and surgery consults for likely infected left arm wound.        Thank you for your consult.     Gus Dunn MD  Hematology/Oncology  [facility] - Emergency Dept  "

## 2023-05-18 NOTE — PLAN OF CARE
VN cued into pt's room for introduction with pt's permission.  VN role explained and informed pt that VN would be working with bedside nurse and the rest of the care team.  Fall risk and bed alarm protocol education provided.  Instructed pt to call for assistance and agreeable.  Allowed time for questions. NAD noted.  Will cont to be available as needed.      05/18/23 6751   Patient Request   Patient Requested water, cups, food tray   Nurse Notification   Bedside Nurse Notified? Yes   Name of Bedside Nurse leti   Nurse Notfication Method Secure Chat   Nurse Notified Of Patient Request   Admission   Initial VN Admission Questions Complete   Communication Issues? None   Shift   Virtual Nurse - Patient Verbalized Approval Of Camera Use;VN Rounding   Safety/Activity   Patient Rounds call light in patient/parent reach;visualized patient   Safety Promotion/Fall Prevention Fall Risk reviewed with patient/family;instructed to call staff for mobility   Pain/Comfort/Sleep   Preferred Pain Scale number (Numeric Rating Pain Scale)   Pain Body Location - Side Left   Pain Body Location elbow   Pain Rating (0-10): Rest 6

## 2023-05-18 NOTE — PHARMACY MED REC
"  [facility]           Pharmacy  Admission Medication History     The home medication history was taken by Darlin Ureña.      Medication history obtained from Medications listed below were obtained from: Patient/family    Based on information gathered for medication list, you may go to "Admission" then "Reconcile Home Medications" tabs to review and/or act upon those items.     The home medication list has been updated by the Pharmacy department.   Please read ALL comments highlighted in yellow.   Please address this information as you see fit.    Feel free to contact us if you have any questions or require assistance.    The medications listed below were removed from the home medication list.  Please reorder if appropriate:    Patient reports NOT TAKING the following medication(s):  Pepcid 20mg  Emla cream  Lisinopril 10mg      No current facility-administered medications on file prior to encounter.     Current Outpatient Medications on File Prior to Encounter   Medication Sig Dispense Refill    allopurinoL (ZYLOPRIM) 300 MG tablet TAKE 1 TABLET BY MOUTH 2 TIMES DAILY. (Patient taking differently: Take 300 mg by mouth 2 (two) times a day.) 60 tablet 2    apixaban (ELIQUIS) 5 mg Tab Take 1 tablet (5 mg total) by mouth 2 (two) times daily. 60 tablet 3    clobetasol 0.05% (TEMOVATE) 0.05 % Oint Apply topically.      ibuprofen (ADVIL,MOTRIN) 200 MG tablet Take 600 mg by mouth as needed for Pain.      mupirocin (BACTROBAN) 2 % ointment Apply topically.      ondansetron (ZOFRAN) 8 MG tablet Take 8 mg by mouth.      oxyCODONE (ROXICODONE) 15 MG Tab Take 7.5 mg by mouth.      prochlorperazine (COMPAZINE) 10 MG tablet Take 10 mg by mouth.      sulfamethoxazole-trimethoprim 800-160mg (BACTRIM DS) 800-160 mg Tab Take 1 tablet by mouth once daily. 30 tablet 3    triamcinolone acetonide 0.1% (KENALOG) 0.1 % cream Apply topically.      valACYclovir (VALTREX) 500 MG tablet Take 1 tablet (500 mg total) by " mouth 2 (two) times daily. 60 tablet 3    predniSONE (DELTASONE) 50 MG Tab TAKE 2 TABLETS BY MOUTH DAILY FOR 5 DAYS. TAKE ON DAYS 1 THROUGH 5         Please address this information as you see fit.  Feel free to contact us if you have any questions or require assistance.    Darlin Ureña  [phone]                .

## 2023-05-18 NOTE — ASSESSMENT & PLAN NOTE
Patient's FSGs are controlled on current medication regimen.  Last A1c reviewed-   Lab Results   Component Value Date    HGBA1C 6.0 (H) 10/26/2022     Most recent fingerstick glucose reviewed- No results for input(s): POCTGLUCOSE in the last 24 hours.  Current correctional scale  Low  Maintain anti-hyperglycemic dose as follows-   Antihyperglycemics (From admission, onward)    Start     Stop Route Frequency Ordered    05/18/23 1304  insulin aspart U-100 pen 0-5 Units         -- SubQ Every 6 hours PRN 05/18/23 1205        Hold Oral hypoglycemics while patient is in the hospital.

## 2023-05-18 NOTE — H&P
[facility] Medicine  History & Physical    Patient Name: Randy Armstrong  MRN: [id number]  Patient Class: OP- Observation  Admission Date: 5/18/2023  Attending Physician: Eulalia Cottrell*   Primary Care Provider: Primary Doctor No         Patient information was obtained from patient, past medical records and ER records.     Subjective:     Principal Problem:Open wound of left elbow    Chief Complaint:   Chief Complaint   Patient presents with    Wound Infection      Pt presents to the ed with a tumor to his left arm that is swollen and red.  Pt states that he is on abx. Pt is 2 months post chemo. Pt states that this wound has been presenting this way for 2 weeks.         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  He was diagnosed with T-cell lymphoma August of 2022.  The wound started around the size of an acorn at this time and then gradually increased in size since.  He has taken Bactrim multiple times over the past year, and is currently taking this regimen for the wound.  He has a left axilla tumor which has received radiation July 2022.  He also has a right lower extremity tumor and DVT for which he is on Eliquis.  Is currently taking acyclovir for shingles prevention.  He denies headache, vision changes, dizziness, lightheadedness, chest pain, shortness of breath, abdominal pain, nausea, vomiting, diarrhea, fever, chill, cough, urinary changes, alcohol, tobacco, or illicit drugs.      Past Medical History:   Diagnosis Date    Cancer        No past surgical history on file.    Review of patient's allergies indicates:   Allergen Reactions    Iodinated contrast media Other (See Comments)     Pt states that  he looks sunburnt afterwards    Vancomycin analogues Other (See Comments)     Red man syndrome       No current facility-administered medications on file prior to encounter.     Current Outpatient Medications on File Prior to Encounter   Medication Sig    allopurinoL (ZYLOPRIM) 300 MG tablet TAKE 1 TABLET BY MOUTH 2 TIMES DAILY. (Patient taking differently: Take 300 mg by mouth 2 (two) times a day.)    apixaban (ELIQUIS) 5 mg Tab Take 1 tablet (5 mg total) by mouth 2 (two) times daily.    clobetasol 0.05% (TEMOVATE) 0.05 % Oint Apply topically.    ibuprofen (ADVIL,MOTRIN) 200 MG tablet Take 600 mg by mouth as needed for Pain.    mupirocin (BACTROBAN) 2 % ointment Apply topically.    ondansetron (ZOFRAN) 8 MG tablet Take 8 mg by mouth.    oxyCODONE (ROXICODONE) 15 MG Tab Take 7.5 mg by mouth.    prochlorperazine (COMPAZINE) 10 MG tablet Take 10 mg by mouth.    sulfamethoxazole-trimethoprim 800-160mg (BACTRIM DS) 800-160 mg Tab Take 1 tablet by mouth once daily.    triamcinolone acetonide 0.1% (KENALOG) 0.1 % cream Apply topically.    valACYclovir (VALTREX) 500 MG tablet Take 1 tablet (500 mg total) by mouth 2 (two) times daily.    predniSONE (DELTASONE) 50 MG Tab TAKE 2 TABLETS BY MOUTH DAILY FOR 5 DAYS. TAKE ON DAYS 1 THROUGH 5    [DISCONTINUED] famotidine (PEPCID) 20 MG tablet Take 20 mg by mouth.    [DISCONTINUED] LIDOcaine-prilocaine (EMLA) cream Apply topically as needed (30 minutes prior to port being accessed). (Patient not taking: Reported on 3/29/2023)    [DISCONTINUED] LIDOcaine-prilocaine (EMLA) cream Apply to port site 30 minutes prior to charlie (Patient not taking: Reported on 3/29/2023)    [DISCONTINUED] lisinopriL 10 MG tablet Take 10 mg by mouth.     Family History    None       Tobacco Use    Smoking status: Never    Smokeless tobacco: Never   Substance and Sexual Activity    Alcohol use: Not on file    Drug use: Not on file    Sexual activity: Not on file     Review of  Systems  Objective:     Vital Signs (Most Recent):  Temp: 99.2 °F (37.3 °C) (05/18/23 0914)  Pulse: 76 (05/18/23 1259)  Resp: 18 (05/18/23 1253)  BP: (!) 154/74 (05/18/23 1004)  SpO2: 95 % (05/18/23 1259) Vital Signs (24h Range):  Temp:  [99.2 °F (37.3 °C)] 99.2 °F (37.3 °C)  Pulse:  [69-86] 76  Resp:  [14-18] 18  SpO2:  [95 %-98 %] 95 %  BP: (124-170)/(65-74) 154/74     Weight: 108.9 kg (240 lb)  Body mass index is 31.66 kg/m².     Physical Exam  Constitutional:       Appearance: Normal appearance.   HENT:      Head: Normocephalic and atraumatic.      Nose: Nose normal.      Mouth/Throat:      Mouth: Mucous membranes are moist.      Pharynx: Oropharynx is clear.   Eyes:      Extraocular Movements: Extraocular movements intact.      Conjunctiva/sclera: Conjunctivae normal.   Cardiovascular:      Rate and Rhythm: Normal rate and regular rhythm.      Pulses: Normal pulses.      Heart sounds: Normal heart sounds.   Pulmonary:      Effort: Pulmonary effort is normal.      Breath sounds: Normal breath sounds.   Abdominal:      General: Abdomen is flat. Bowel sounds are normal.      Palpations: Abdomen is soft.   Musculoskeletal:         General: Normal range of motion.      Cervical back: Normal range of motion and neck supple.   Skin:     General: Skin is warm and dry.      Comments: Left axilla tumor with erythema    Left elbow necrotic wound please see clinical image    Right lower extremity edematous related to tumor and known DVT   Neurological:      General: No focal deficit present.      Mental Status: He is alert and oriented to person, place, and time.                    Significant Labs: All pertinent labs within the past 24 hours have been reviewed.    Significant Imaging: I have reviewed all pertinent imaging results/findings within the past 24 hours.    Assessment/Plan:     * Open wound of left elbow  No evidence of acute fracture or dislocation.  Minor enthesopathic change about the elbow joint.  No large  elbow joint effusion is seen.     Skin ulceration is noted of the dorsal and ulnar aspect of the arm, in keeping with known mass lesion in this location.  Appears to be overlying bandage material.  Nonhealing necrotic wound to the left elbow.  Site of tumor necrosis.  Patient has taken multiple courses of p.o. Bactrim  Wound continues to grow in size and is nonhealing  Blood cultures pending  Check ESR, CRP  Patient is nonseptic appearing  Continue IV cefepime  History allergy to vancomycin with red man syndrome  Consult ID, Hematology, orthopedic surgery  Pain control    Acute deep vein thrombosis (DVT) of right lower extremity    Present prior to admission  Hold Eliquis at this time pending surgical procedure  Will cover with prophylactic dose heparin  Resume Eliquis as soon as possible    Mycosis fungoides    Noted history    Type 2 diabetes mellitus without complication, without long-term current use of insulin  Patient's FSGs are controlled on current medication regimen.  Last A1c reviewed-   Lab Results   Component Value Date    HGBA1C 6.0 (H) 10/26/2022     Most recent fingerstick glucose reviewed- No results for input(s): POCTGLUCOSE in the last 24 hours.  Current correctional scale  Low  Maintain anti-hyperglycemic dose as follows-   Antihyperglycemics (From admission, onward)    Start     Stop Route Frequency Ordered    05/18/23 1304  insulin aspart U-100 pen 0-5 Units         -- SubQ Every 6 hours PRN 05/18/23 1205        Hold Oral hypoglycemics while patient is in the hospital.    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 Eulalia Cottrell.      La Wren NP  Department of Hospital Medicine  [facility] - Emergency Dept

## 2023-05-18 NOTE — PROGRESS NOTES
Non-compliant [facility] report chart review -  05/18/2023  Chart review completed for the following HM test if overdue  (Colonoscopy, Diabetic lab testing, and/or Dilated EYE EXAM)   Care Everywhere and Media reports - updates requested and reviewed.    [facility] and [facility] reviewed.

## 2023-05-18 NOTE — CONSULTS
Subjective:      Patient ID: Randy Armstrong is a 72 y.o. male.    Chief Complaint: Wound Infection ( Pt presents to the ed with a tumor to his left arm that is swollen and red.  Pt states that he is on abx. Pt is 2 months post chemo. Pt states that this wound has been presenting this way for 2 weeks. )      HPI  Randy Armstrong is a  72 y.o. male presenting today for evaluation of left elbow open wound related to lymphoma and tumor.  There was not a history of trauma.  Onset of symptoms began several weeks ago and seemed to get worse after discontinuing chemotherapy for his lymphoma   Is treated at [facility] in [place]  He is scheduled to return to used in 2 weeks' time   Is admitted for IV antibiotics and wound care of the left arm   .      Review of patient's allergies indicates:   Allergen Reactions    Iodinated contrast media Other (See Comments)     Pt states that he looks sunburnt afterwards    Vancomycin analogues Other (See Comments)     Red man syndrome         Current Facility-Administered Medications   Medication Dose Route Frequency Provider Last Rate Last Admin    acetaminophen tablet 650 mg  650 mg Oral Q8H PRN La Wren NP        allopurinoL tablet 300 mg  300 mg Oral BID La Wren NP   300 mg at 05/18/23 1304    cefazolin (ANCEF) 2 gram in dextrose 5% 50 mL IVPB (premix)  2 g Intravenous Q8H Noé Lee MD        dextrose 10% bolus 125 mL 125 mL  12.5 g Intravenous PRN La Wren NP        dextrose 10% bolus 250 mL 250 mL  25 g Intravenous PRN La Wren NP        doxycycline tablet 100 mg  100 mg Oral Q12H Noé Lee MD        glucagon (human recombinant) injection 1 mg  1 mg Intramuscular PRN La Wren NP        heparin (porcine) injection 5,000 Units  5,000 Units Subcutaneous Q8H La Wren NP   5,000 Units at 05/18/23 1556    HYDROcodone-acetaminophen 5-325 mg per tablet 1 tablet  1 tablet Oral Q6H PRN La CHEW  Holger, NP   1 tablet at 05/18/23 1253    insulin aspart U-100 pen 0-5 Units  0-5 Units Subcutaneous Q6H PRN La Wren NP        naloxone 0.4 mg/mL injection 0.02 mg  0.02 mg Intravenous PRN La Wren NP        ondansetron injection 4 mg  4 mg Intravenous Q8H PRN La Wren NP        sodium chloride 0.9% flush 10 mL  10 mL Intravenous Q12H PRN La Wren NP        valACYclovir tablet 500 mg  500 mg Oral BID La Wren NP           Past Medical History:   Diagnosis Date    Cancer        No past surgical history on file.    Review of Systems:  ROS    OBJECTIVE:     PHYSICAL EXAM:    Weight: 108.9 kg (240 lb)  Vitals:    05/18/23 1344 05/18/23 1404 05/18/23 1523 05/18/23 1551   BP: (!) 160/74 137/68 (!) 145/75    Pulse: 68 70 70    Resp:   20    Temp:   97.5 °F (36.4 °C)    TempSrc:       SpO2: 95% 97% 99% 97%   Weight:         Well developed, well nourished male in no acute distress  Alert and oriented x 3  HEENT- Normal exam  Lungs- Clear to auscultation  Heart- Regular rate and rhythm  Abdomen- Soft nontender  Extremity exam- examination left arm shows a large fungating mass on the posterior aspect of the left elbow it is foul smelling has some surrounding redness and induration mildly tender around the elbow right range of motion is actually pretty good does not appear to involve the joint    RADIOGRAPHS:  AP lateral x-ray of the left elbow show no bony involvement or involvement of the joint  Comments: I have personally reviewed the imaging and I agree with the above radiologist's report.    ASSESSMENT/PLAN:     IMPRESSION:  Large fungating mass left elbow related to tumor formation possibly infected    PLAN:  I explained the nature of the problem to the patient   I think IV antibiotics and wound care as a 1st step but I do not know if this can be treated without surgical amputation of the left arm   We talked about that briefly today he is not interested in  surgery   It might be worth pushing up his appointment to [facility] or starting some radiation treatment here in town       - We talked at length about the anatomy and pathophysiology of @DX@        Disclaimer: This note has been generated using voice-recognition software. There may be typographical errors that have been missed during proof-reading.

## 2023-05-18 NOTE — CONSULTS
[facility] Infectious Diseases Consult Note    Primary Attending Physician: Dr Barboza  Consultant Attending: Dr Lee    Reason for Consult:     Necrotic wound left elbow, history of mycosis fungoides     Assessment/Recommendations     Left upper extremity tumor with concern for superimposed cellulitis. Unclear if just progression of tumor vs superimposed infection at this time. Patient states he has had infection of tumor sites requiring IV abx while at [facility] multiple times. Afebrile, WBC 4.80. Blood cultures 5/18 in process. Received Zosyn and Cefepime in ED. Now on cefepime monotherapy.  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 Please contact us if you have any questions regarding this consult.    Julia Fonseca   [facility]    Subjective:      History of Present Illness:  Randy Armstrong is a 72 y.o. male with PMHx of T2DM, HTN, peripheral T cell lymphoma s/p chemo/radiation, RLE DVT who presented 5/18/23 with a non-healing wound to the left elbow.    Wound is overlying tumor. Patient states that wound has been worsening the last two weeks. He admits to pain at the site and a foul smell. He admits to chronically taking bactrim one pill daily for months. Denies fever, chills. Admits to spending a lot of time outdoors. 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 Has no pets at home.    Past Medical History:  Past Medical History:   Diagnosis Date    Cancer        Past Surgical History:  No past surgical history on file.    Allergies:  Review of patient's allergies indicates:   Allergen Reactions    Iodinated contrast media Other (See Comments)     Pt states that he looks sunburnt  afterwards    Vancomycin analogues Other (See Comments)     Red man syndrome       Medications:   In-Hospital Scheduled Medications:   allopurinoL  300 mg Oral BID    ceFEPime (MAXIPIME) IVPB  2 g Intravenous Q8H    heparin (porcine)  5,000 Units Subcutaneous Q8H    valACYclovir  500 mg Oral BID      In-Hospital PRN Medications:  acetaminophen, dextrose 10%, dextrose 10%, glucagon (human recombinant), HYDROcodone-acetaminophen, insulin aspart U-100, naloxone, ondansetron, sodium chloride 0.9%   In-Hospital IV Infusion Medications:     Home Medications:  Prior to Admission medications    Medication Sig Start Date End Date Taking? Authorizing Provider   allopurinoL (ZYLOPRIM) 300 MG tablet TAKE 1 TABLET BY MOUTH 2 TIMES DAILY.  Patient taking differently: Take 300 mg by mouth 2 (two) times a day. 3/8/23  Yes Teodora Rashid MD   apixaban (ELIQUIS) 5 mg Tab Take 1 tablet (5 mg total) by mouth 2 (two) times daily. 3/16/23  Yes Teodora Rashid MD   clobetasol 0.05% (TEMOVATE) 0.05 % Oint Apply topically. 7/4/22  Yes Historical Provider   ibuprofen (ADVIL,MOTRIN) 200 MG tablet Take 600 mg by mouth as needed for Pain.   Yes Historical Provider   mupirocin (BACTROBAN) 2 % ointment Apply topically. 7/4/22  Yes Historical Provider   ondansetron (ZOFRAN) 8 MG tablet Take 8 mg by mouth. 10/28/22  Yes Historical Provider   oxyCODONE (ROXICODONE) 15 MG Tab Take 7.5 mg by mouth. 10/28/22  Yes Historical Provider   prochlorperazine (COMPAZINE) 10 MG tablet Take 10 mg by mouth. 10/28/22  Yes Historical Provider   sulfamethoxazole-trimethoprim 800-160mg (BACTRIM DS) 800-160 mg Tab Take 1 tablet by mouth once daily. 1/23/23  Yes Teodora Rashid MD   triamcinolone acetonide 0.1% (KENALOG) 0.1 % cream Apply topically. 9/26/22  Yes Historical Provider   valACYclovir (VALTREX) 500 MG tablet Take 1 tablet (500 mg total) by mouth 2 (two) times daily. 1/27/23  Yes Teodora Rashid MD   predniSONE (DELTASONE) 50 MG Tab TAKE 2 TABLETS BY  MOUTH DAILY FOR 5 DAYS. TAKE ON DAYS 1 THROUGH 5 3/13/23   Historical Provider   famotidine (PEPCID) 20 MG tablet Take 20 mg by mouth. 10/28/22 5/18/23  Historical Provider   LIDOcaine-prilocaine (EMLA) cream Apply topically as needed (30 minutes prior to port being accessed).  Patient not taking: Reported on 3/29/2023 1/17/23 5/18/23  Teodora Rashid MD   LIDOcaine-prilocaine (EMLA) cream Apply to port site 30 minutes prior to charlie  Patient not taking: Reported on 3/29/2023 1/13/23 5/18/23  Teodora Rashid MD   lisinopriL 10 MG tablet Take 10 mg by mouth. 22  Historical Provider       Family History:  No family history on file.    Social History:  Social History     Tobacco Use    Smoking status: Never    Smokeless tobacco: Never       ROS  Pertinent ROS noted in HPI. All others reviewed and negative.     Objective:   Last 24 Hour Vital Signs:  BP  Min: 124/65  Max: 170/129  Temp  Av.4 °F (36.9 °C)  Min: 97.5 °F (36.4 °C)  Max: 99.2 °F (37.3 °C)  Pulse  Av.9  Min: 68  Max: 86  Resp  Av.5  Min: 14  Max: 20  SpO2  Av.2 %  Min: 95 %  Max: 99 %  Weight  Av.9 kg (240 lb)  Min: 108.9 kg (240 lb)  Max: 108.9 kg (240 lb)  No intake/output data recorded.    Physical Exam  Vitals and nursing note reviewed.   Constitutional:       General: He is not in acute distress.     Appearance: He is not toxic-appearing.   HENT:      Head: Normocephalic and atraumatic.      Right Ear: External ear normal.      Left Ear: External ear normal.      Nose: Nose normal. No congestion or rhinorrhea.      Mouth/Throat:      Mouth: Mucous membranes are dry.      Pharynx: Oropharynx is clear. No oropharyngeal exudate or posterior oropharyngeal erythema.   Eyes:      General: No scleral icterus.        Right eye: No discharge.         Left eye: No discharge.      Extraocular Movements: Extraocular movements intact.      Conjunctiva/sclera: Conjunctivae normal.   Cardiovascular:      Rate and Rhythm: Normal  rate and regular rhythm.      Pulses: Normal pulses.      Heart sounds: Normal heart sounds. No murmur heard.  Pulmonary:      Effort: Pulmonary effort is normal. No respiratory distress.      Breath sounds: Normal breath sounds.   Abdominal:      General: Abdomen is flat. Bowel sounds are normal. There is no distension.      Palpations: Abdomen is soft.      Tenderness: There is no abdominal tenderness.   Musculoskeletal:      Right lower leg: No edema.      Left lower leg: No edema.   Skin:     General: Skin is warm and dry.      Capillary Refill: Capillary refill takes less than 2 seconds.      Comments: Wound to LUE with overlying bandage. Generalized erythema of skin. Multiple raised, erythematous lesions in various locations.    Neurological:      General: No focal deficit present.      Mental Status: He is alert and oriented to person, place, and time.      Cranial Nerves: No cranial nerve deficit.   Psychiatric:         Mood and Affect: Mood normal.         Behavior: Behavior normal.                 Laboratory Results:  Trended Lab Data:  Recent Labs   Lab 05/18/23  0935   WBC 4.80   HGB 11.3*   HCT 33.8*   *   *   RDW 13.1      K 4.5      CO2 22*   BUN 11   *   PROT 6.7   ALBUMIN 3.6   BILITOT 0.4   AST 34   ALKPHOS 73   ALT 36     Urinalysis: No results found for: LABURIN, COLORU, CLARITYU, SPECGRAV, LABSPEC, NITRITE, PROTEINUR, GLUCOSEU, KETONESU, UROBILINOGEN, BILIRUBINUR, BLOODU    Microbiology Data:  Bcx x2 5/18: in process    Antimicrobials:  Cefepime  Valacyclovir     Other Results:    Radiology:  X-Ray Chest 1 View    Result Date: 5/18/2023  EXAMINATION: XR CHEST 1 VIEW CLINICAL HISTORY: Sepsis; TECHNIQUE: Single frontal view of the chest was performed. COMPARISON: None FINDINGS: Cardiac monitoring leads.  Tunnel right internal jugular approach port catheter, with tip at or near cavoatrial junction. Cardiac contour grossly unremarkable.  Calcification at the left  hilar region corresponding to suspected calcified lymph node on 03/15/2023 PET/CT. Suspect left basilar atelectasis.  Lungs otherwise essentially clear.  No definite pneumothorax or large volume pleural effusion. No acute findings in the visualized abdomen.  Osseous and soft tissue structures appear without definite acute abnormality.     Please see discussion above. Electronically signed by: Jhonathan Machado Date:    05/18/2023 Time:    10:47    X-Ray Elbow Complete Left    Result Date: 5/18/2023  EXAMINATION: XR ELBOW COMPLETE 3 VIEW LEFT CLINICAL HISTORY: Unspecified open wound of left elbow, initial encounter TECHNIQUE: AP, lateral, and oblique views of the left elbow were performed. COMPARISON: None FINDINGS: No evidence of acute fracture or dislocation.  Minor enthesopathic change about the elbow joint.  No large elbow joint effusion is seen. Skin ulceration is noted of the dorsal and ulnar aspect of the arm, in keeping with known mass lesion in this location.  Appears to be overlying bandage material.     Please see discussion above. Electronically signed by: Jhonathan Machado Date:    05/18/2023 Time:    10:45

## 2023-05-18 NOTE — ED PROVIDER NOTES
Encounter Date: 5/18/2023       History     Chief Complaint   Patient presents with    Wound Infection      Pt presents to the ed with a tumor to his left arm that is swollen and red.  Pt states that he is on abx. Pt is 2 months post chemo. Pt states that this wound has been presenting this way for 2 weeks.      72-year-old male presents to ED with concern of pain and worsened appearing wound to left arm.  Patient has significant history of Mycosis Fungoides, along with unmanaged diabetes and hypertension.  He did undergo chemotherapy, which he finished roughly 1 month ago.  Currently on no cancer treatment/radiation.  He does report he has been on Bactrim for 2 months.  He reports over past 2 weeks his left elbow wound has continued to worsen in pain, size and drainage despite taking his Bactrim.  ED reach out to his  He denies any associated fevers, chills, body aches, recent sicknesses along with no chest pain, cough, shortness of breath, abdominal pain, urinary or bowel complaints, nausea or vomiting.  No other acute complaints at this time.    The history is provided by the patient.   Review of patient's allergies indicates:   Allergen Reactions    Iodinated contrast media Other (See Comments)     Pt states that he looks sunburnt afterwards    Vancomycin analogues Other (See Comments)     Red man syndrome     Past Medical History:   Diagnosis Date    Cancer      No past surgical history on file.  No family history on file.  Social History     Tobacco Use    Smoking status: Never    Smokeless tobacco: Never     Review of Systems   Constitutional:  Negative for chills and fever.   HENT:  Negative for congestion and sore throat.    Respiratory:  Negative for cough and shortness of breath.    Cardiovascular:  Negative for chest pain.   Gastrointestinal:  Negative for abdominal pain, diarrhea, nausea and vomiting.   Genitourinary:  Negative for dysuria.   Musculoskeletal:  Negative for back pain, neck pain and neck  stiffness.   Skin:  Positive for wound.     Physical Exam     Initial Vitals [05/18/23 0914]   BP Pulse Resp Temp SpO2   (!) 170/74 86 14 99.2 °F (37.3 °C) 98 %      MAP       --         Physical Exam    Nursing note and vitals reviewed.  Constitutional: He appears well-developed and well-nourished. He is cooperative. He does not have a sickly appearance. He does not appear ill. No distress.   HENT:   Head: Normocephalic and atraumatic.   Eyes: EOM are normal.   Neck: Neck supple.   Normal range of motion.  Cardiovascular:  Normal rate, regular rhythm and normal heart sounds.           Pulmonary/Chest: Effort normal and breath sounds normal.   Abdominal: Abdomen is soft. There is no abdominal tenderness.   Musculoskeletal:         General: No tenderness.      Cervical back: Normal range of motion and neck supple.     Neurological: He is alert and oriented to person, place, and time. GCS score is 15. GCS eye subscore is 4. GCS verbal subscore is 5. GCS motor subscore is 6.   Skin: Skin is warm and dry.   8 cm diameter wound to distal/posterior humerus.  Malodorous with serosanguineous drainage.  Tenderness to site.  Distal sensation intact.  Radial pulse intact.  Full range of motion left elbow.  See image below.  Also noted to have erythematous appearing wound to left axillary region.  No drainage.   Psychiatric: He has a normal mood and affect. His speech is normal and behavior is normal. Thought content normal.         ED Course   Procedures  Labs Reviewed   CBC W/ AUTO DIFFERENTIAL - Abnormal; Notable for the following components:       Result Value    RBC 3.23 (*)     Hemoglobin 11.3 (*)     Hematocrit 33.8 (*)      (*)     MCH 35.0 (*)     Platelets 142 (*)     Lymph # 0.4 (*)     Gran % 79.6 (*)     Lymph % 8.5 (*)     All other components within normal limits   COMPREHENSIVE METABOLIC PANEL - Abnormal; Notable for the following components:    CO2 22 (*)     Glucose 133 (*)     All other components  within normal limits   CULTURE, BLOOD   CULTURE, BLOOD   LACTIC ACID, PLASMA   MAGNESIUM   PHOSPHORUS   URINALYSIS, REFLEX TO URINE CULTURE   LACTIC ACID, PLASMA          Imaging Results              X-Ray Chest 1 View (Final result)  Result time 05/18/23 10:47:20      Final result by Jhonathan Machado MD (05/18/23 10:47:20)                   Impression:      Please see discussion above.      Electronically signed by: Jhonathan Machado  Date:    05/18/2023  Time:    10:47               Narrative:    EXAMINATION:  XR CHEST 1 VIEW    CLINICAL HISTORY:  Sepsis;    TECHNIQUE:  Single frontal view of the chest was performed.    COMPARISON:  None    FINDINGS:  Cardiac monitoring leads.  Tunnel right internal jugular approach port catheter, with tip at or near cavoatrial junction.    Cardiac contour grossly unremarkable.  Calcification at the left hilar region corresponding to suspected calcified lymph node on 03/15/2023 PET/CT.    Suspect left basilar atelectasis.  Lungs otherwise essentially clear.  No definite pneumothorax or large volume pleural effusion.    No acute findings in the visualized abdomen.  Osseous and soft tissue structures appear without definite acute abnormality.                                       X-Ray Elbow Complete Left (Final result)  Result time 05/18/23 10:45:02      Final result by Jhonathan Machado MD (05/18/23 10:45:02)                   Impression:      Please see discussion above.      Electronically signed by: Jhonathan Machado  Date:    05/18/2023  Time:    10:45               Narrative:    EXAMINATION:  XR ELBOW COMPLETE 3 VIEW LEFT    CLINICAL HISTORY:  Unspecified open wound of left elbow, initial encounter    TECHNIQUE:  AP, lateral, and oblique views of the left elbow were performed.    COMPARISON:  None    FINDINGS:  No evidence of acute fracture or dislocation.  Minor enthesopathic change about the elbow joint.  No large elbow joint effusion is seen.    Skin ulceration is noted of  the dorsal and ulnar aspect of the arm, in keeping with known mass lesion in this location.  Appears to be overlying bandage material.                                       Medications   ceFEPIme (MAXIPIME) 2 g in dextrose 5 % in water (D5W) 5 % 50 mL IVPB (MB+) (2 g Intravenous New Bag 5/18/23 1148)   lactated ringers bolus 1,000 mL (0 mLs Intravenous Stopped 5/18/23 1107)   piperacillin-tazobactam (ZOSYN) 4.5 g in dextrose 5 % in water (D5W) 5 % 100 mL IVPB (MB+) (0 g Intravenous Stopped 5/18/23 1038)     Medical Decision Making:   Initial Assessment:   Patient presents with concern of worsened appearing wound/tumor to left arm.  Worsening in pain, size and drainage over past 2 weeks despite taking home Bactrim.  Denying fevers, chills, body aches or recent sicknesses.  Afebrile arrival with vitals grossly unremarkable, noting hypertensive.  Large 8 cm tumor/open wound to left distal/posterior humerus.  Malodorous with serosanguineous drainage.  Full range of motion of left elbow.  Distal sensation intact.  Radial pulse intact.  Differential Diagnosis:   Wound infection, tumor  ED Management:  9:49 AM  Sirs criteria is not met at this time but code sepsis was initiated by ED staff due to patient's history, active cancer, recent chemotherapy, worsening wound.  Will begin gentle IV fluids as patient's vitals are currently unremarkable, along with IV Zosyn.  Allergy to vanc.    CBC grossly unremarkable with no elevation in WBC.  H/H appearing near baseline.  CMP grossly unremarkable.  Lactic WNL.  Mag and phos WNL.  With careful consideration, I do not suspect sepsis at this time.    X-ray left elbow noting overlying skin changes but with no acute bony abnormalities or large joint effusion.  CXR with no significant acute cardiopulmonary findings.    Due to gradually worsening wound appearance and failed outpatient p.o. antibiotics, will plan to placed in observation.  Patient was discussed with [facility], Dr. Shah, who has accepted patient for observation.    Patient discussed with attending, Dr. Santiago, who agrees with ED course and dispo.                              Clinical Impression:   Final diagnoses:  [S51.002A] Open wound of left elbow        ED Disposition Condition    Observation                 Grover Rivas PA-C  05/18/23 1149

## 2023-05-18 NOTE — ASSESSMENT & PLAN NOTE
Present prior to admission  Hold Eliquis at this time pending surgical procedure  Will cover with prophylactic dose heparin  Resume Eliquis as soon as possible

## 2023-05-19 LAB
ALBUMIN SERPL BCP-MCNC: 3.1 G/DL (ref 3.5–5.2)
ALP SERPL-CCNC: 64 U/L (ref 55–135)
ALT SERPL W/O P-5'-P-CCNC: 28 U/L (ref 10–44)
ANION GAP SERPL CALC-SCNC: 8 MMOL/L (ref 8–16)
AST SERPL-CCNC: 31 U/L (ref 10–40)
BASOPHILS # BLD AUTO: 0.02 K/UL (ref 0–0.2)
BASOPHILS NFR BLD: 0.5 % (ref 0–1.9)
BILIRUB SERPL-MCNC: 0.3 MG/DL (ref 0.1–1)
BILIRUB UR QL STRIP: NEGATIVE
BUN SERPL-MCNC: 8 MG/DL (ref 8–23)
CALCIUM SERPL-MCNC: 9.2 MG/DL (ref 8.7–10.5)
CHLORIDE SERPL-SCNC: 105 MMOL/L (ref 95–110)
CLARITY UR: CLEAR
CO2 SERPL-SCNC: 24 MMOL/L (ref 23–29)
COLOR UR: YELLOW
CREAT SERPL-MCNC: 0.8 MG/DL (ref 0.5–1.4)
DIFFERENTIAL METHOD: ABNORMAL
EOSINOPHIL # BLD AUTO: 0.1 K/UL (ref 0–0.5)
EOSINOPHIL NFR BLD: 2.9 % (ref 0–8)
ERYTHROCYTE [DISTWIDTH] IN BLOOD BY AUTOMATED COUNT: 13 % (ref 11.5–14.5)
ERYTHROCYTE [SEDIMENTATION RATE] IN BLOOD BY PHOTOMETRIC METHOD: 32 MM/HR (ref 0–23)
EST. GFR  (NO RACE VARIABLE): >60 ML/MIN/1.73 M^2
GLUCOSE SERPL-MCNC: 98 MG/DL (ref 70–110)
GLUCOSE UR QL STRIP: NEGATIVE
HCT VFR BLD AUTO: 28.4 % (ref 40–54)
HGB BLD-MCNC: 9.6 G/DL (ref 14–18)
HGB UR QL STRIP: NEGATIVE
IMM GRANULOCYTES # BLD AUTO: 0.01 K/UL (ref 0–0.04)
IMM GRANULOCYTES NFR BLD AUTO: 0.3 % (ref 0–0.5)
INR PPP: 1.1 (ref 0.8–1.2)
KETONES UR QL STRIP: NEGATIVE
LEUKOCYTE ESTERASE UR QL STRIP: NEGATIVE
LYMPHOCYTES # BLD AUTO: 0.4 K/UL (ref 1–4.8)
LYMPHOCYTES NFR BLD: 10.4 % (ref 18–48)
MAGNESIUM SERPL-MCNC: 1.9 MG/DL (ref 1.6–2.6)
MCH RBC QN AUTO: 34.5 PG (ref 27–31)
MCHC RBC AUTO-ENTMCNC: 33.8 G/DL (ref 32–36)
MCV RBC AUTO: 102 FL (ref 82–98)
MONOCYTES # BLD AUTO: 0.5 K/UL (ref 0.3–1)
MONOCYTES NFR BLD: 13.1 % (ref 4–15)
NEUTROPHILS # BLD AUTO: 2.8 K/UL (ref 1.8–7.7)
NEUTROPHILS NFR BLD: 72.8 % (ref 38–73)
NITRITE UR QL STRIP: NEGATIVE
NRBC BLD-RTO: 0 /100 WBC
PH UR STRIP: 6 [PH] (ref 5–8)
PHOSPHATE SERPL-MCNC: 4.6 MG/DL (ref 2.7–4.5)
PLATELET # BLD AUTO: 127 K/UL (ref 150–450)
PMV BLD AUTO: 9.8 FL (ref 9.2–12.9)
POCT GLUCOSE: 103 MG/DL (ref 70–110)
POCT GLUCOSE: 116 MG/DL (ref 70–110)
POCT GLUCOSE: 127 MG/DL (ref 70–110)
POCT GLUCOSE: 93 MG/DL (ref 70–110)
POTASSIUM SERPL-SCNC: 4.1 MMOL/L (ref 3.5–5.1)
PROT SERPL-MCNC: 5.7 G/DL (ref 6–8.4)
PROT UR QL STRIP: NEGATIVE
PROTHROMBIN TIME: 11.5 SEC (ref 9–12.5)
RBC # BLD AUTO: 2.78 M/UL (ref 4.6–6.2)
SODIUM SERPL-SCNC: 137 MMOL/L (ref 136–145)
SP GR UR STRIP: 1.02 (ref 1–1.03)
URN SPEC COLLECT METH UR: NORMAL
UROBILINOGEN UR STRIP-ACNC: NEGATIVE EU/DL
WBC # BLD AUTO: 3.83 K/UL (ref 3.9–12.7)

## 2023-05-19 PROCEDURE — 83735 ASSAY OF MAGNESIUM: CPT | Performed by: NURSE PRACTITIONER

## 2023-05-19 PROCEDURE — 87075 CULTR BACTERIA EXCEPT BLOOD: CPT | Performed by: SURGERY

## 2023-05-19 PROCEDURE — 80053 COMPREHEN METABOLIC PANEL: CPT | Performed by: NURSE PRACTITIONER

## 2023-05-19 PROCEDURE — 87070 CULTURE OTHR SPECIMN AEROBIC: CPT | Performed by: SURGERY

## 2023-05-19 PROCEDURE — 85610 PROTHROMBIN TIME: CPT | Performed by: NURSE PRACTITIONER

## 2023-05-19 PROCEDURE — 85025 COMPLETE CBC W/AUTO DIFF WBC: CPT | Performed by: NURSE PRACTITIONER

## 2023-05-19 PROCEDURE — 85652 RBC SED RATE AUTOMATED: CPT | Performed by: FAMILY MEDICINE

## 2023-05-19 PROCEDURE — 87077 CULTURE AEROBIC IDENTIFY: CPT | Performed by: SURGERY

## 2023-05-19 PROCEDURE — 63600175 PHARM REV CODE 636 W HCPCS: Performed by: NURSE PRACTITIONER

## 2023-05-19 PROCEDURE — 81003 URINALYSIS AUTO W/O SCOPE: CPT | Performed by: PHYSICIAN ASSISTANT

## 2023-05-19 PROCEDURE — 25000003 PHARM REV CODE 250: Performed by: NURSE PRACTITIONER

## 2023-05-19 PROCEDURE — 84100 ASSAY OF PHOSPHORUS: CPT | Performed by: NURSE PRACTITIONER

## 2023-05-19 PROCEDURE — G0378 HOSPITAL OBSERVATION PER HR: HCPCS

## 2023-05-19 PROCEDURE — 25000003 PHARM REV CODE 250: Performed by: SURGERY

## 2023-05-19 PROCEDURE — 96372 THER/PROPH/DIAG INJ SC/IM: CPT | Performed by: NURSE PRACTITIONER

## 2023-05-19 PROCEDURE — 36415 COLL VENOUS BLD VENIPUNCTURE: CPT | Performed by: NURSE PRACTITIONER

## 2023-05-19 PROCEDURE — 25000003 PHARM REV CODE 250: Performed by: INTERNAL MEDICINE

## 2023-05-19 PROCEDURE — 63600175 PHARM REV CODE 636 W HCPCS: Performed by: INTERNAL MEDICINE

## 2023-05-19 RX ORDER — HYDROCODONE BITARTRATE AND ACETAMINOPHEN 5; 325 MG/1; MG/1
2 TABLET ORAL EVERY 6 HOURS PRN
Status: DISCONTINUED | OUTPATIENT
Start: 2023-05-19 | End: 2023-05-20 | Stop reason: HOSPADM

## 2023-05-19 RX ORDER — GENTAMICIN SULFATE 1 MG/G
OINTMENT TOPICAL 3 TIMES DAILY
Status: DISCONTINUED | OUTPATIENT
Start: 2023-05-19 | End: 2023-05-20 | Stop reason: HOSPADM

## 2023-05-19 RX ORDER — MORPHINE SULFATE 2 MG/ML
2 INJECTION, SOLUTION INTRAMUSCULAR; INTRAVENOUS ONCE
Status: COMPLETED | OUTPATIENT
Start: 2023-05-19 | End: 2023-05-19

## 2023-05-19 RX ORDER — GENTAMICIN SULFATE 1 MG/G
OINTMENT TOPICAL 3 TIMES DAILY
Status: DISCONTINUED | OUTPATIENT
Start: 2023-05-19 | End: 2023-05-19

## 2023-05-19 RX ADMIN — MORPHINE SULFATE 2 MG: 2 INJECTION, SOLUTION INTRAMUSCULAR; INTRAVENOUS at 01:05

## 2023-05-19 RX ADMIN — CEFAZOLIN SODIUM 2 G: 2 SOLUTION INTRAVENOUS at 03:05

## 2023-05-19 RX ADMIN — HYDROCODONE BITARTRATE AND ACETAMINOPHEN 2 TABLET: 5; 325 TABLET ORAL at 09:05

## 2023-05-19 RX ADMIN — HYDROCODONE BITARTRATE AND ACETAMINOPHEN 2 TABLET: 5; 325 TABLET ORAL at 03:05

## 2023-05-19 RX ADMIN — CEFAZOLIN SODIUM 2 G: 2 SOLUTION INTRAVENOUS at 08:05

## 2023-05-19 RX ADMIN — HYDROCODONE BITARTRATE AND ACETAMINOPHEN 1 TABLET: 5; 325 TABLET ORAL at 03:05

## 2023-05-19 RX ADMIN — VALACYCLOVIR HYDROCHLORIDE 500 MG: 500 TABLET, FILM COATED ORAL at 09:05

## 2023-05-19 RX ADMIN — CEFAZOLIN SODIUM 2 G: 2 SOLUTION INTRAVENOUS at 12:05

## 2023-05-19 RX ADMIN — DOXYCYCLINE HYCLATE 100 MG: 100 TABLET, COATED ORAL at 08:05

## 2023-05-19 RX ADMIN — DOXYCYCLINE HYCLATE 100 MG: 100 TABLET, COATED ORAL at 09:05

## 2023-05-19 RX ADMIN — VALACYCLOVIR HYDROCHLORIDE 500 MG: 500 TABLET, FILM COATED ORAL at 08:05

## 2023-05-19 RX ADMIN — COLLAGENASE SANTYL: 250 OINTMENT TOPICAL at 06:05

## 2023-05-19 RX ADMIN — GENTAMICIN SULFATE: 1 OINTMENT TOPICAL at 06:05

## 2023-05-19 RX ADMIN — ACETAMINOPHEN 650 MG: 325 TABLET ORAL at 12:05

## 2023-05-19 RX ADMIN — ALLOPURINOL 300 MG: 100 TABLET ORAL at 09:05

## 2023-05-19 RX ADMIN — HEPARIN SODIUM 5000 UNITS: 5000 INJECTION INTRAVENOUS; SUBCUTANEOUS at 06:05

## 2023-05-19 RX ADMIN — APIXABAN 5 MG: 5 TABLET, FILM COATED ORAL at 08:05

## 2023-05-19 RX ADMIN — ALLOPURINOL 300 MG: 100 TABLET ORAL at 08:05

## 2023-05-19 NOTE — PROGRESS NOTES
[facility]  Progress Note    Patient Name: Randy Armstrong  MRN: [id number]  Admission Date: 5/18/2023  Hospital Length of Stay: 0 days  Attending Provider: Eulalia Cottrell*  Primary Care Provider: Primary Doctor No    Subjective:     Principal Problem:Open wound of left elbow    Principal Orthopedic Problem:  Open wound/tumor left elbow    Interval History:  Patient remains about the same with some drainage from the left elbow  Case was discussed with Dr. Agrawal who is recommended wound care through [facility]   Referral also made to Dr. Goel at [facility] who was an orthopedic oncologist    Review of patient's allergies indicates:   Allergen Reactions    Iodinated contrast media Other (See Comments)     Pt states that he looks sunburnt afterwards    Vancomycin analogues Other (See Comments)     Red man syndrome       Current Facility-Administered Medications   Medication    acetaminophen tablet 650 mg    allopurinoL tablet 300 mg    apixaban tablet 5 mg    cefazolin (ANCEF) 2 gram in dextrose 5% 50 mL IVPB (premix)    dextrose 10% bolus 125 mL 125 mL    dextrose 10% bolus 250 mL 250 mL    doxycycline tablet 100 mg    glucagon (human recombinant) injection 1 mg    HYDROcodone-acetaminophen 5-325 mg per tablet 2 tablet    insulin aspart U-100 pen 0-5 Units    naloxone 0.4 mg/mL injection 0.02 mg    ondansetron injection 4 mg    sodium chloride 0.9% flush 10 mL    valACYclovir tablet 500 mg     Objective:     Vital Signs (Most Recent):  Temp: 96.8 °F (36 °C) (05/19/23 1538)  Pulse: 67 (05/19/23 1545)  Resp: 17 (05/19/23 1547)  BP: (!) 142/70 (05/19/23 1538)  SpO2: 97 % (05/19/23 1538) Vital Signs (24h Range):  Temp:  [96 °F (35.6 °C)-98.5 °F (36.9 °C)] 96.8 °F (36 °C)  Pulse:  [] 67  Resp:  [17-20] 17  SpO2:  [93 %-100 %] 97 %  BP: (136-182)/(63-82) 142/70     Weight: 113.2 kg (249 lb 9 oz)  Height: 6' (182.9 cm)  Body mass index is 33.85 kg/m².      Intake/Output Summary (Last  24 hours) at 5/19/2023 1700  Last data filed at 5/19/2023 1630  Gross per 24 hour   Intake 600 ml   Output 300 ml   Net 300 ml       Ortho/SPM Exam    Significant Labs: All pertinent labs within the past 24 hours have been reviewed.    Significant Imaging: None    Assessment/Plan:     Active Diagnoses:    Diagnosis Date Noted POA    PRINCIPAL PROBLEM:  Open wound of left elbow [S51.002A] 05/18/2023 Unknown    Acute deep vein thrombosis (DVT) of right lower extremity [I82.401] 05/18/2023 Unknown    Lymphoma [C85.90] 05/18/2023 Yes    Gout of multiple sites [M10.9] 11/28/2022 Yes    Type 2 diabetes mellitus without complication, without long-term current use of insulin [E11.9] 11/28/2022 Yes    Peripheral T cell lymphoma of axillary lymph nodes [C84.44] 11/23/2022 Yes    Mycosis fungoides [C84.00] 05/05/2022 Yes      Problems Resolved During this Admission:     Plan:  Continue local wound care left elbow   Continue IV antibiotics per ID recommendations   I have encouraged the patient to contact his radiation oncologist for further treatment options for the left elbow tumor   He is also been offered a follow-up appointment with Dr. Goel at [facility]  The patient is not sure he needs this follow-up appointment at this time    Bryant Salomon Jr, MD  Orthopedics  [facility]

## 2023-05-19 NOTE — PROGRESS NOTES
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 Unclear if just progression of tumor vs superimposed infection. Patient states he has had infection of tumor sites requiring IV abx while at [facility] multiple times. Afebrile, no leukocytosis. Blood cultures 5/18 NG x 1 day. Seen by ortho who offered patient LUE amputation, patient refused. Has follow up at [facility] onc . On doxy, ancef to cover for staph and strep spp.  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 Case has been discussed with consult staff, who is in agreement with assessment and plan.     Subjective:      No acute events overnight. Admits to some pain in left elbow. Had trouble sleeping overnight 2/2 noise. Patient not interested in pursuing L arm amputation at this point after discussion with ortho.      Objective:   Last 24 Hour Vital Signs:  BP  Min: 136/63  Max: 182/75  Temp  Av.7 °F (36.5 °C)  Min: 96 °F (35.6 °C)  Max: 98.5 °F (36.9 °C)  Pulse  Av.2  Min: 62  Max: 101  Resp  Av.9  Min: 17  Max: 20  SpO2  Av.5 %  Min: 93 %  Max: 100 %  Height  Av' (182.9 cm)  Min: 6' (182.9 cm)  Max: 6' (182.9 cm)  Weight  Av.2 kg (249 lb 9 oz)  Min: 113.2 kg (249 lb 9 oz)  Max: 113.2 kg (249 lb 9 oz)  I/O last 3 completed shifts:  In: 0   Out: 300 [Urine:300]    Physical Exam  Vitals and nursing note reviewed.   Constitutional:       General: He is not in acute distress.     Appearance: He is not toxic-appearing.   HENT:       Head: Normocephalic and atraumatic.      Right Ear: External ear normal.      Left Ear: External ear normal.      Nose: Nose normal. No congestion or rhinorrhea.      Mouth/Throat:      Mouth: Mucous membranes are moist.     Pharynx: Oropharynx is clear. No oropharyngeal exudate or posterior oropharyngeal erythema.   Eyes:      General: No scleral icterus.        Right eye: No discharge.         Left eye: No discharge.      Extraocular Movements: Extraocular movements intact.      Conjunctiva/sclera: Conjunctivae normal.   Cardiovascular:      Rate and Rhythm: Normal rate and regular rhythm.      Pulses: Normal pulses.      Heart sounds: Normal heart sounds. No murmur heard.  Pulmonary:      Effort: Pulmonary effort is normal. No respiratory distress.      Breath sounds: Normal breath sounds.   Abdominal:      General: Abdomen is flat. Bowel sounds are normal. There is no distension.      Palpations: Abdomen is soft.      Tenderness: There is no abdominal tenderness.   Musculoskeletal:      Right lower leg: No edema.      Left lower leg: No edema.   Skin:     General: Skin is warm and dry.      Capillary Refill: Capillary refill takes less than 2 seconds.      Comments: Wound to LUE with overlying bandage. Multiple raised, erythematous lesions in various locations.    Neurological:      General: No focal deficit present.      Mental Status: He is alert and oriented to person, place, and time.      Cranial Nerves: No cranial nerve deficit.   Psychiatric:         Mood and Affect: Mood normal.         Behavior: Behavior normal.     Laboratory:  Laboratory Data Reviewed: yes  Pertinent Findings:  Recent Labs   Lab 05/18/23  0935 05/19/23  0330   WBC 4.80 3.83*   HGB 11.3* 9.6*   HCT 33.8* 28.4*   * 127*   * 102*   RDW 13.1 13.0    137   K 4.5 4.1    105   CO2 22* 24   BUN 11 8   CREATININE 1.0 0.8   * 98   PROT 6.7 5.7*   ALBUMIN 3.6 3.1*   BILITOT 0.4 0.3   AST 34 31   ALKPHOS 73  64   ALT 36 28     Microbiology Data:  Bcx x2 5/18: NGTD    Antimicrobials:  Doxy  Ancef    Other Results:  Radiology Results:  X-Ray Chest 1 View    Result Date: 5/18/2023  EXAMINATION: XR CHEST 1 VIEW CLINICAL HISTORY: Sepsis; TECHNIQUE: Single frontal view of the chest was performed. COMPARISON: None FINDINGS: Cardiac monitoring leads.  Tunnel right internal jugular approach port catheter, with tip at or near cavoatrial junction. Cardiac contour grossly unremarkable.  Calcification at the left hilar region corresponding to suspected calcified lymph node on 03/15/2023 PET/CT. Suspect left basilar atelectasis.  Lungs otherwise essentially clear.  No definite pneumothorax or large volume pleural effusion. No acute findings in the visualized abdomen.  Osseous and soft tissue structures appear without definite acute abnormality.     Please see discussion above. Electronically signed by: Jhonathan Machado Date:    05/18/2023 Time:    10:47    X-Ray Elbow Complete Left    Result Date: 5/18/2023  EXAMINATION: XR ELBOW COMPLETE 3 VIEW LEFT CLINICAL HISTORY: Unspecified open wound of left elbow, initial encounter TECHNIQUE: AP, lateral, and oblique views of the left elbow were performed. COMPARISON: None FINDINGS: No evidence of acute fracture or dislocation.  Minor enthesopathic change about the elbow joint.  No large elbow joint effusion is seen. Skin ulceration is noted of the dorsal and ulnar aspect of the arm, in keeping with known mass lesion in this location.  Appears to be overlying bandage material.     Please see discussion above. Electronically signed by: Jhonathan Machado Date:    05/18/2023 Time:    10:45      Current Medications:     Infusions:       Scheduled:   allopurinoL  300 mg Oral BID    apixaban  5 mg Oral BID    ceFAZolin (ANCEF) IVPB  2 g Intravenous Q8H    doxycycline  100 mg Oral Q12H    valACYclovir  500 mg Oral BID        PRN:  acetaminophen, dextrose 10%, dextrose 10%, glucagon (human recombinant),  HYDROcodone-acetaminophen, insulin aspart U-100, naloxone, ondansetron, sodium chloride 0.9%

## 2023-05-19 NOTE — PROGRESS NOTES
[place] - Med Surg  Adult Nutrition  Progress Note    SUMMARY       Recommendations  1. Recommend live BID to promote wound healing   2. Continue to encourage compliance to cardiac, carbohydrate controlled diet   3. Recommend to continue to obtain daily weights   4. Collaboration with medical providers    Goals: Patient to continue to consume adequate po intake prior to RD follow up.  Nutrition Goal Status: progressing towards goal  Communication of RD Recs: other (comment) (poc)    Assessment and Plan  Nutrition Problem  Increased/Modified nutritional needs     Related to (etiology):   Diabetes   Cancer     Signs and Symptoms (as evidenced by):   Weight loss   Unhealing wound     Interventions(treatment strategy):  Commercial beverage  Collaboration with medical providers     Nutrition Diagnosis Status:   New      Malnutrition Assessment       RD to assess with NFPE on next follow up visit.                                Reason for Assessment    Reason For Assessment: identified at risk by screening criteria    Diagnosis: cancer diagnosis/related complications, other (see comments) (open wound of left elbow)    Relevant Medical History: PMH of Peripheral T cell lymphoma- status post radiation, chemotherapy follows [facility], cellulitis, HTN, Mycosis fungoides, gout, DM presents with nonhealing left elbow necrotic wound worse for the past 2 weeks    Interdisciplinary Rounds: did not attend (RD remote)    General Information Comments:   5/19/2023: Patient is on a cardiac, carbohydrate controlled diet with fair intake at this time.  Meal consumption noted between %.  Patient reported 34lb weight loss during past chemotherapy treatments.  Patient to return for follow up visit to [facility] in 2 weeks.  Medical chart weight history reviewed.  Weight loss noted.  Previous weights of 127.6kg on 11/23/2022 and 115.4kg on 2/2023. RD to continue to monitor malnutrition risk and assess with  NFPE upon follow up as patient is at higher risk for malnutrition due to disease state. Open wound noted to left elbow.  RD to recommend live to promote wound healing.  Labs reviewed.  NKFA.  LBM:2023.    Nutrition Discharge Planning: Patient to continue on cardiac, carbohydrate controlled diet post discharge    Nutrition Risk Screen    Nutrition Risk Screen: large or nonhealing wound, burn or pressure injury    Nutrition/Diet History    Spiritual, Cultural Beliefs, Moravian Practices, Values that Affect Care: no  Food Allergies: NKFA    Anthropometrics    Temp: 98 °F (36.7 °C)  Height: 6' (182.9 cm)  Height (inches): 72 in  Weight Method: Bed Scale  Weight: 113.2 kg (249 lb 9 oz)  Weight (lb): 249.56 lb  Ideal Body Weight (IBW), Male: 178 lb  % Ideal Body Weight, Male (lb): 140.2 %  BMI (Calculated): 33.8  BMI Grade: 30 - 34.9- obesity - grade I  Weight Loss: unintentional  Usual Body Weight (UBW), k.6 kg (on 2022)  % Usual Body Weight: 88.9  % Weight Change From Usual Weight: -11.29 %       Lab/Procedures/Meds    Pertinent Labs Reviewed: reviewed  BMP  Lab Results   Component Value Date     2023    K 4.1 2023     2023    CO2 24 2023    BUN 8 2023    CREATININE 0.8 2023    CALCIUM 9.2 2023    ANIONGAP 8 2023    EGFRNORACEVR >60 2023     Lab Results   Component Value Date    HGBA1C 4.9 2023     Lab Results   Component Value Date    CALCIUM 9.2 2023    PHOS 4.6 (H) 2023       Pertinent Medications Reviewed: reviewed  Scheduled Meds:   allopurinoL  300 mg Oral BID    ceFAZolin (ANCEF) IVPB  2 g Intravenous Q8H    doxycycline  100 mg Oral Q12H    heparin (porcine)  5,000 Units Subcutaneous Q8H    valACYclovir  500 mg Oral BID     Continuous Infusions:  PRN Meds:.acetaminophen, dextrose 10%, dextrose 10%, glucagon (human recombinant), HYDROcodone-acetaminophen, insulin aspart U-100, naloxone, ondansetron, sodium  chloride 0.9%    Physical Findings/Assessment         Estimated/Assessed Needs    Weight Used For Calorie Calculations: 113.2 kg (249 lb 9 oz)  Energy Calorie Requirements (kcal): 20kcals/kg (2264kcals/day)  Energy Need Method: Kcal/kg  Protein Requirements: 2.0g x IBW (80.9) =162g/day  Weight Used For Protein Calculations: 113.2 kg (249 lb 9 oz)  Fluid Requirements (mL): 1mL/kcal  Estimated Fluid Requirement Method: RDA Method  RDA Method (mL): 20  CHO Requirement: 250      Nutrition Prescription Ordered    Current Diet Order: Cardiac, carbohydrate controlled diet  Oral Nutrition Supplement: Lalito BID    Evaluation of Received Nutrient/Fluid Intake    % Kcal Needs: %  % Protein Needs: %  I/O: -180ml since admit  Energy Calories Required: meeting needs  Protein Required: meeting needs  Fluid Required: meeting needs  Comments: LBM:5/18/2023  Tolerance: tolerating  % Intake of Estimated Energy Needs: 75 - 100 %  % Meal Intake: 75 - 100 %    Nutrition Risk    Level of Risk/Frequency of Follow-up: moderate - high     Monitor and Evaluation    Food and Nutrient Intake: energy intake, food and beverage intake  Food and Nutrient Adminstration: diet order  Physical Activity and Function: nutrition-related ADLs and IADLs, factors affecting access to physical activity  Anthropometric Measurements: height/length, weight, weight change, body mass index  Biochemical Data, Medical Tests and Procedures: glucose/endocrine profile, gastrointestinal profile, electrolyte and renal panel, inflammatory profile, lipid profile  Nutrition-Focused Physical Findings: skin     Nutrition Follow-Up    RD Follow-up?: Yes  Charley Carrillo, MS, RDN, LDN

## 2023-05-19 NOTE — PLAN OF CARE
SOCIAL WORK DISCHARGE PLANNING ASSESSMENT    Sw completed discharge planning assessment with pt in pt's room K522. Pt was easily engaged and education on the role of  was provided. Pt reported he lives with his ex-wife. Pt stated he has no DME and is not current with  services. Pt stated his ex-wife drives him to doctor appointments and will take an uber home upon discharge. No needs for community resources were reported. Pt was encouraged to call with any questions or concerns. Pt verbalized understanding.     Future Appointments   Date Time Provider Department Center   6/1/2023  7:20 AM Angely Sofia MD [facility]        Patient Active Problem List   Diagnosis    Peripheral T cell lymphoma of axillary lymph nodes    Wound healing, delayed    HTN (hypertension)    Gout of multiple sites    Type 2 diabetes mellitus without complication, without long-term current use of insulin    Mycosis fungoides    Open wound of left elbow    Acute deep vein thrombosis (DVT) of right lower extremity    Lymphoma        05/19/23 1509   Discharge Assessment   Assessment Type Discharge Planning Assessment   Confirmed/corrected address, phone number and insurance Yes   Confirmed Demographics Correct on Facesheet   Source of Information patient   Communicated CHARLINE with patient/caregiver Date not available/Unable to determine   People in Home significant other   Facility Arrived From: home   Do you expect to return to your current living situation? Yes   Do you have help at home or someone to help you manage your care at home? Yes   Who are your caregiver(s) and their phone number(s)? pt's ex-wife   Prior to hospitilization cognitive status: Alert/Oriented   Current cognitive status: Alert/Oriented   Home Accessibility wheelchair accessible   Home Layout Able to live on 1st floor   Readmission within 30 days? No   Patient currently being followed by outpatient case management? No   Do you currently have  service(s) that help you manage your care at home? No   Do you take prescription medications? Yes   Do you have prescription coverage? Yes   Coverage Medicare   Do you have any problems affording any of your prescribed medications? No   Is the patient taking medications as prescribed? yes   Who is going to help you get home at discharge? pt's ex-wife   How do you get to doctors appointments? family or friend will provide   Are you on dialysis? No   Do you take coumadin? No   Discharge Plan A Home with family;Home   DME Needed Upon Discharge    (TBD)   Discharge Plan discussed with: Patient   Transition of Care Barriers None   Physical Activity   On average, how many days per week do you engage in moderate to strenuous exercise (like a brisk walk)? 5 days   On average, how many minutes do you engage in exercise at this level? 40 min   Financial Resource Strain   How hard is it for you to pay for the very basics like food, housing, medical care, and heating? Not hard   Housing Stability   In the last 12 months, was there a time when you were not able to pay the mortgage or rent on time? N   In the last 12 months, how many places have you lived? 1   In the last 12 months, was there a time when you did not have a steady place to sleep or slept in a shelter (including now)? N   Transportation Needs   In the past 12 months, has lack of transportation kept you from medical appointments or from getting medications? no   In the past 12 months, has lack of transportation kept you from meetings, work, or from getting things needed for daily living? No   Food Insecurity   Within the past 12 months, you worried that your food would run out before you got the money to buy more. Never true   Within the past 12 months, the food you bought just didn't last and you didn't have money to get more. Never true   Stress   Do you feel stress - tense, restless, nervous, or anxious, or unable to sleep at night because your mind is troubled  all the time - these days? Very much   Social Connections   In a typical week, how many times do you talk on the phone with family, friends, or neighbors? More than 3   How often do you get together with friends or relatives? More than 3   How often do you attend Sikh or Jainism services? Patient refu   Do you belong to any clubs or organizations such as Sikh groups, unions, fraternal or athletic groups, or school groups? Yes   How often do you attend meetings of the clubs or organizations you belong to? More than 4   Are you , , , , never , or living with a partner?    Alcohol Use   Q1: How often do you have a drink containing alcohol? Patient refu   Q2: How many drinks containing alcohol do you have on a typical day when you are drinking? Patient refu   Q3: How often do you have six or more drinks on one occasion? Patient refu   OTHER   Name(s) of People in Home pt's ex-wife

## 2023-05-19 NOTE — ASSESSMENT & PLAN NOTE
No evidence of acute fracture or dislocation.  Minor enthesopathic change about the elbow joint.  No large elbow joint effusion is seen.     Skin ulceration is noted of the dorsal and ulnar aspect of the arm, in keeping with known mass lesion in this location.  Appears to be overlying bandage material.      Nonhealing necrotic wound to the left elbow.  Site of tumor necrosis.  Patient has taken multiple courses of p.o. Bactrim  Wound continues to grow in size and is nonhealing  Blood cultures pending  Check ESR pending, CRP 34.6  Patient is nonseptic appearing  Continue IV cefepime   History allergy to vancomycin with red man syndrome  Consult ID, Hematology, orthopedic surgery  Pain control- increase pain regime today     ID changed abx to IV ancef and PO doxycycline   Per ortho- think IV antibiotics and wound care as a 1st step but  do not know if this can be treated without surgical amputation of the left arm   Patient is not interested in surgery   Per hemoc- abx, wound care, patient to go to [facility]

## 2023-05-19 NOTE — PLAN OF CARE
Nutrition Plan of Care:    Recommendations  1. Recommend live BID to promote wound healing   2. Continue to encourage compliance to cardiac, carbohydrate controlled diet   3. Recommend to continue to obtain daily weights   4. Collaboration with medical providers     Goals: Patient to continue to consume adequate po intake prior to RD follow up.  Nutrition Goal Status: progressing towards goal  Communication of RD Recs: other (comment) (poc)     Assessment and Plan  Nutrition Problem  Increased/Modified nutritional needs      Related to (etiology):   Diabetes   Cancer      Signs and Symptoms (as evidenced by):   Weight loss   Unhealing wound      Interventions(treatment strategy):  Commercial beverage  Collaboration with medical providers      Nutrition Diagnosis Status:   New        Malnutrition Assessment    RD to assess with NFPE on next follow up visit.      Charley Carrillo, MS, RDN, LDN

## 2023-05-19 NOTE — ASSESSMENT & PLAN NOTE
Present prior to admission  Hold Eliquis at this time pending surgical procedure  Will cover with prophylactic dose heparin  Resume Eliquis as soon as possible- resume 5/19

## 2023-05-19 NOTE — PLAN OF CARE
Problem: Adult Inpatient Plan of Care  Goal: Plan of Care Review  Outcome: Ongoing, Progressing  Goal: Patient-Specific Goal (Individualized)  Outcome: Ongoing, Progressing  Goal: Absence of Hospital-Acquired Illness or Injury  Outcome: Ongoing, Progressing  Goal: Optimal Comfort and Wellbeing  Outcome: Ongoing, Progressing  Goal: Readiness for Transition of Care  Outcome: Ongoing, Progressing     Problem: Diabetes Comorbidity  Goal: Blood Glucose Level Within Targeted Range  Outcome: Ongoing, Progressing     Problem: Infection  Goal: Absence of Infection Signs and Symptoms  Outcome: Ongoing, Progressing     Problem: Hypertension Comorbidity  Goal: Blood Pressure in Desired Range  Outcome: Ongoing, Progressing     Problem: Fall Injury Risk  Goal: Absence of Fall and Fall-Related Injury  Outcome: Ongoing, Progressing       POC reviewed with pt, following- VSS, NADN, pt resting quietly this shift. PRN medication given for pain. IV ABX per order. No falls or injuries noted, CB in reach at all times.

## 2023-05-19 NOTE — SUBJECTIVE & OBJECTIVE
Interval hx: this am patient reports 7/10 pain to wound.     Review of Systems  Objective:     Vital Signs (Most Recent):  Temp: 96 °F (35.6 °C) (05/19/23 1137)  Pulse: 86 (05/19/23 1143)  Resp: 20 (05/19/23 1137)  BP: 139/82 (05/19/23 1137)  SpO2: 96 % (05/19/23 1137) Vital Signs (24h Range):  Temp:  [96 °F (35.6 °C)-98.5 °F (36.9 °C)] 96 °F (35.6 °C)  Pulse:  [] 86  Resp:  [17-20] 20  SpO2:  [93 %-100 %] 96 %  BP: (136-182)/() 139/82     Weight: 113.2 kg (249 lb 9 oz)  Body mass index is 33.85 kg/m².     Physical Exam  Constitutional:       Appearance: Normal appearance.   HENT:      Head: Normocephalic and atraumatic.      Nose: Nose normal.      Mouth/Throat:      Mouth: Mucous membranes are moist.      Pharynx: Oropharynx is clear.   Eyes:      Extraocular Movements: Extraocular movements intact.      Conjunctiva/sclera: Conjunctivae normal.   Cardiovascular:      Rate and Rhythm: Normal rate and regular rhythm.      Pulses: Normal pulses.      Heart sounds: Normal heart sounds.   Pulmonary:      Effort: Pulmonary effort is normal.      Breath sounds: Normal breath sounds.   Abdominal:      General: Abdomen is flat. Bowel sounds are normal.      Palpations: Abdomen is soft.   Musculoskeletal:         General: Normal range of motion.      Cervical back: Normal range of motion and neck supple.   Skin:     General: Skin is warm and dry.      Comments: Left axilla tumor with erythema    Left elbow necrotic wound please see clinical image    Right lower extremity edematous related to tumor and known DVT   Neurological:      General: No focal deficit present.      Mental Status: He is alert and oriented to person, place, and time.                    Significant Labs: All pertinent labs within the past 24 hours have been reviewed.    Significant Imaging: I have reviewed all pertinent imaging results/findings within the past 24 hours.

## 2023-05-19 NOTE — PLAN OF CARE
"RN OBS Review    Per attending NP progress note:  "Pain control- increase pain regime today      ID changed abx to IV ancef and PO doxycycline   Per ortho- think IV antibiotics and wound care as a 1st step but  do not know if this can be treated without surgical amputation of the left arm   Patient is not interested in surgery   Per hemoc- abx, wound care, patient to go to [facility] 6/6"    Future Appointments   Date Time Provider Department Center   6/1/2023  7:20 AM Angely Sofia MD [facility]     /82   Pulse 86   Temp 96 °F (35.6 °C)   Resp 20   Ht 6' (1.829 m)   Wt 113.2 kg (249 lb 9 oz)   SpO2 96%   BMI 33.85 kg/m²      allopurinoL  300 mg Oral BID    apixaban  5 mg Oral BID    ceFAZolin (ANCEF) IVPB  2 g Intravenous Q8H    doxycycline  100 mg Oral Q12H    valACYclovir  500 mg Oral BID     Consults (From admission, onward)          Status Ordering Provider     Inpatient consult to General Surgery  Once        Provider:  Luciana Agrawal MD    Acknowledged GAURANG SALOMON JR     Inpatient consult to Hematology/Oncology  Once        Provider:  Gus Dunn MD    Completed GUERDA ASTORGA     Inpatient consult to Infectious Diseases  Once        Provider:  Noé Lee MD    Completed GUERDA ASTORGA     Inpatient consult to Orthopedic Surgery  Once        Provider:  Gaurang Salomon Jr., MD    Completed GUERDA ASTORGA            "

## 2023-05-19 NOTE — ASSESSMENT & PLAN NOTE
Patient's FSGs are controlled on current medication regimen.  Last A1c reviewed-   Lab Results   Component Value Date    HGBA1C 4.9 05/18/2023     Most recent fingerstick glucose reviewed-   Recent Labs   Lab 05/18/23  1528 05/18/23  1936 05/19/23  0513 05/19/23  1138   POCTGLUCOSE 79 146* 93 116*     Current correctional scale  Low  Maintain anti-hyperglycemic dose as follows-   Antihyperglycemics (From admission, onward)    Start     Stop Route Frequency Ordered    05/18/23 1304  insulin aspart U-100 pen 0-5 Units         -- SubQ Every 6 hours PRN 05/18/23 1205        Hold Oral hypoglycemics while patient is in the hospital.

## 2023-05-19 NOTE — PROGRESS NOTES
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  He was diagnosed with T-cell lymphoma August of 2022.  The wound started around the size of an acorn at this time and then gradually increased in size since.  He has taken Bactrim multiple times over the past year, and is currently taking this regimen for the wound.  He has a left axilla tumor which has received radiation July 2022.  He also has a right lower extremity tumor and DVT for which he is on Eliquis.  Is currently taking acyclovir for shingles prevention.  He denies headache, vision changes, dizziness, lightheadedness, chest pain, shortness of breath, abdominal pain, nausea, vomiting, diarrhea, fever, chill, cough, urinary changes, alcohol, tobacco, or illicit drugs.      Overview/Hospital Course:  No notes on file    Interval hx: this am patient reports 7/10 pain to wound.     Review of Systems  Objective:     Vital Signs (Most Recent):  Temp: 96 °F (35.6 °C) (05/19/23 1137)  Pulse: 86 (05/19/23 1143)  Resp: 20 (05/19/23 1137)  BP: 139/82 (05/19/23 1137)  SpO2: 96 % (05/19/23 1137) Vital Signs (24h Range):  Temp:  [96 °F (35.6 °C)-98.5 °F (36.9 °C)] 96 °F (35.6 °C)  Pulse:  [] 86  Resp:  [17-20] 20  SpO2:  [93 %-100 %] 96 %  BP: (136-182)/() 139/82     Weight: 113.2 kg (249 lb 9 oz)  Body mass index is 33.85 kg/m².     Physical  Exam  Constitutional:       Appearance: Normal appearance.   HENT:      Head: Normocephalic and atraumatic.      Nose: Nose normal.      Mouth/Throat:      Mouth: Mucous membranes are moist.      Pharynx: Oropharynx is clear.   Eyes:      Extraocular Movements: Extraocular movements intact.      Conjunctiva/sclera: Conjunctivae normal.   Cardiovascular:      Rate and Rhythm: Normal rate and regular rhythm.      Pulses: Normal pulses.      Heart sounds: Normal heart sounds.   Pulmonary:      Effort: Pulmonary effort is normal.      Breath sounds: Normal breath sounds.   Abdominal:      General: Abdomen is flat. Bowel sounds are normal.      Palpations: Abdomen is soft.   Musculoskeletal:         General: Normal range of motion.      Cervical back: Normal range of motion and neck supple.   Skin:     General: Skin is warm and dry.      Comments: Left axilla tumor with erythema    Left elbow necrotic wound please see clinical image    Right lower extremity edematous related to tumor and known DVT   Neurological:      General: No focal deficit present.      Mental Status: He is alert and oriented to person, place, and time.                    Significant Labs: All pertinent labs within the past 24 hours have been reviewed.    Significant Imaging: I have reviewed all pertinent imaging results/findings within the past 24 hours.      Assessment/Plan:      * Open wound of left elbow  No evidence of acute fracture or dislocation.  Minor enthesopathic change about the elbow joint.  No large elbow joint effusion is seen.     Skin ulceration is noted of the dorsal and ulnar aspect of the arm, in keeping with known mass lesion in this location.  Appears to be overlying bandage material.      Nonhealing necrotic wound to the left elbow.  Site of tumor necrosis.  Patient has taken multiple courses of p.o. 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  Consult ID, Hematology, orthopedic surgery  Pain control- increase pain regime today     ID changed abx to IV ancef and PO doxycycline   Per ortho- think IV antibiotics and wound care as a 1st step but  do not know if this can be treated without surgical amputation of the left arm   Patient is not interested in surgery   Per hemoc- abx, wound care, patient to go to [facility] 6/6    Lymphoma        Acute deep vein thrombosis (DVT) of right lower extremity    Present prior to admission  Hold Eliquis at this time pending surgical procedure  Will cover with prophylactic dose heparin  Resume Eliquis as soon as possible- resume 5/19    Mycosis fungoides    Noted history    Type 2 diabetes mellitus without complication, without long-term current use of insulin  Patient's FSGs are controlled on current medication regimen.  Last A1c reviewed-   Lab Results   Component Value Date    HGBA1C 4.9 05/18/2023     Most recent fingerstick glucose reviewed-   Recent Labs   Lab 05/18/23  1528 05/18/23  1936 05/19/23  0513 05/19/23  1138   POCTGLUCOSE 79 146* 93 116*     Current correctional scale  Low  Maintain anti-hyperglycemic dose as follows-   Antihyperglycemics (From admission, onward)    Start     Stop Route Frequency Ordered    05/18/23 1304  insulin aspart U-100 pen 0-5 Units         -- SubQ Every 6 hours PRN 05/18/23 1205        Hold Oral hypoglycemics while patient is in the hospital.    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    Status post chemo/ radiation  Follows [facility] and [facility]      VTE Risk Mitigation (From admission, onward)         Ordered     apixaban tablet 5 mg  2 times daily         05/19/23 1213     IP VTE HIGH RISK PATIENT  Once         05/18/23 1203     Place sequential compression device  Until discontinued         05/18/23 1203                 Discharge Planning   CHARLINE:      Code Status: Full Code   Is the patient medically ready for discharge?:     Reason for patient still in hospital (select all that apply): Patient trending condition                     La Wren NP  Department of [facility] Medicine   [facility]

## 2023-05-20 VITALS
BODY MASS INDEX: 33.8 KG/M2 | HEIGHT: 72 IN | RESPIRATION RATE: 18 BRPM | TEMPERATURE: 98 F | HEART RATE: 77 BPM | OXYGEN SATURATION: 98 % | SYSTOLIC BLOOD PRESSURE: 161 MMHG | DIASTOLIC BLOOD PRESSURE: 74 MMHG | WEIGHT: 249.56 LBS

## 2023-05-20 LAB
ALBUMIN SERPL BCP-MCNC: 3.1 G/DL (ref 3.5–5.2)
ALP SERPL-CCNC: 63 U/L (ref 55–135)
ALT SERPL W/O P-5'-P-CCNC: 24 U/L (ref 10–44)
ANION GAP SERPL CALC-SCNC: 8 MMOL/L (ref 8–16)
AST SERPL-CCNC: 28 U/L (ref 10–40)
BASOPHILS # BLD AUTO: 0.03 K/UL (ref 0–0.2)
BASOPHILS NFR BLD: 0.8 % (ref 0–1.9)
BILIRUB SERPL-MCNC: 0.3 MG/DL (ref 0.1–1)
BUN SERPL-MCNC: 9 MG/DL (ref 8–23)
CALCIUM SERPL-MCNC: 9.2 MG/DL (ref 8.7–10.5)
CHLORIDE SERPL-SCNC: 107 MMOL/L (ref 95–110)
CO2 SERPL-SCNC: 24 MMOL/L (ref 23–29)
CREAT SERPL-MCNC: 0.8 MG/DL (ref 0.5–1.4)
DIFFERENTIAL METHOD: ABNORMAL
EOSINOPHIL # BLD AUTO: 0.1 K/UL (ref 0–0.5)
EOSINOPHIL NFR BLD: 3.6 % (ref 0–8)
ERYTHROCYTE [DISTWIDTH] IN BLOOD BY AUTOMATED COUNT: 13 % (ref 11.5–14.5)
EST. GFR  (NO RACE VARIABLE): >60 ML/MIN/1.73 M^2
GLUCOSE SERPL-MCNC: 98 MG/DL (ref 70–110)
HCT VFR BLD AUTO: 28.9 % (ref 40–54)
HGB BLD-MCNC: 9.8 G/DL (ref 14–18)
IMM GRANULOCYTES # BLD AUTO: 0.01 K/UL (ref 0–0.04)
IMM GRANULOCYTES NFR BLD AUTO: 0.3 % (ref 0–0.5)
LYMPHOCYTES # BLD AUTO: 0.4 K/UL (ref 1–4.8)
LYMPHOCYTES NFR BLD: 10.1 % (ref 18–48)
MAGNESIUM SERPL-MCNC: 1.9 MG/DL (ref 1.6–2.6)
MCH RBC QN AUTO: 34.5 PG (ref 27–31)
MCHC RBC AUTO-ENTMCNC: 33.9 G/DL (ref 32–36)
MCV RBC AUTO: 102 FL (ref 82–98)
MONOCYTES # BLD AUTO: 0.4 K/UL (ref 0.3–1)
MONOCYTES NFR BLD: 12 % (ref 4–15)
NEUTROPHILS # BLD AUTO: 2.6 K/UL (ref 1.8–7.7)
NEUTROPHILS NFR BLD: 73.2 % (ref 38–73)
NRBC BLD-RTO: 0 /100 WBC
PHOSPHATE SERPL-MCNC: 4.7 MG/DL (ref 2.7–4.5)
PLATELET # BLD AUTO: 122 K/UL (ref 150–450)
PMV BLD AUTO: 10.1 FL (ref 9.2–12.9)
POCT GLUCOSE: 103 MG/DL (ref 70–110)
POCT GLUCOSE: 108 MG/DL (ref 70–110)
POCT GLUCOSE: 118 MG/DL (ref 70–110)
POTASSIUM SERPL-SCNC: 3.8 MMOL/L (ref 3.5–5.1)
PROT SERPL-MCNC: 5.7 G/DL (ref 6–8.4)
RBC # BLD AUTO: 2.84 M/UL (ref 4.6–6.2)
SODIUM SERPL-SCNC: 139 MMOL/L (ref 136–145)
WBC # BLD AUTO: 3.57 K/UL (ref 3.9–12.7)

## 2023-05-20 PROCEDURE — 84100 ASSAY OF PHOSPHORUS: CPT | Performed by: NURSE PRACTITIONER

## 2023-05-20 PROCEDURE — 25000003 PHARM REV CODE 250: Performed by: NURSE PRACTITIONER

## 2023-05-20 PROCEDURE — 85025 COMPLETE CBC W/AUTO DIFF WBC: CPT | Performed by: NURSE PRACTITIONER

## 2023-05-20 PROCEDURE — 63600175 PHARM REV CODE 636 W HCPCS: Performed by: INTERNAL MEDICINE

## 2023-05-20 PROCEDURE — 36415 COLL VENOUS BLD VENIPUNCTURE: CPT | Performed by: NURSE PRACTITIONER

## 2023-05-20 PROCEDURE — 80053 COMPREHEN METABOLIC PANEL: CPT | Performed by: NURSE PRACTITIONER

## 2023-05-20 PROCEDURE — 83735 ASSAY OF MAGNESIUM: CPT | Performed by: NURSE PRACTITIONER

## 2023-05-20 PROCEDURE — 25000003 PHARM REV CODE 250: Performed by: INTERNAL MEDICINE

## 2023-05-20 PROCEDURE — 11000001 HC ACUTE MED/SURG PRIVATE ROOM

## 2023-05-20 RX ORDER — GENTAMICIN SULFATE 1 MG/G
OINTMENT TOPICAL 3 TIMES DAILY
Qty: 30 G | Refills: 1 | Status: SHIPPED | OUTPATIENT
Start: 2023-05-20 | End: 2023-05-22 | Stop reason: SDUPTHER

## 2023-05-20 RX ORDER — SULFAMETHOXAZOLE AND TRIMETHOPRIM 800; 160 MG/1; MG/1
1 TABLET ORAL 2 TIMES DAILY
Qty: 28 TABLET | Refills: 0 | Status: SHIPPED | OUTPATIENT
Start: 2023-05-20 | End: 2023-06-03

## 2023-05-20 RX ADMIN — GENTAMICIN SULFATE: 1 OINTMENT TOPICAL at 09:05

## 2023-05-20 RX ADMIN — ALLOPURINOL 300 MG: 100 TABLET ORAL at 09:05

## 2023-05-20 RX ADMIN — COLLAGENASE SANTYL: 250 OINTMENT TOPICAL at 09:05

## 2023-05-20 RX ADMIN — DOXYCYCLINE HYCLATE 100 MG: 100 TABLET, COATED ORAL at 09:05

## 2023-05-20 RX ADMIN — CEFAZOLIN SODIUM 2 G: 2 SOLUTION INTRAVENOUS at 03:05

## 2023-05-20 RX ADMIN — HYDROCODONE BITARTRATE AND ACETAMINOPHEN 2 TABLET: 5; 325 TABLET ORAL at 11:05

## 2023-05-20 RX ADMIN — VALACYCLOVIR HYDROCHLORIDE 500 MG: 500 TABLET, FILM COATED ORAL at 09:05

## 2023-05-20 RX ADMIN — HYDROCODONE BITARTRATE AND ACETAMINOPHEN 2 TABLET: 5; 325 TABLET ORAL at 03:05

## 2023-05-20 RX ADMIN — CEFAZOLIN SODIUM 2 G: 2 SOLUTION INTRAVENOUS at 12:05

## 2023-05-20 RX ADMIN — APIXABAN 5 MG: 5 TABLET, FILM COATED ORAL at 09:05

## 2023-05-20 NOTE — PLAN OF CARE
Pt is AAOX4. Scheduled meds were given per MAR. No C/O pain or N/V. Family is present at the bedside. Bed in lowest position, bed alarm is set. Call light within reach.   Problem: Adult Inpatient Plan of Care  Goal: Plan of Care Review  Outcome: Ongoing, Progressing     Problem: Diabetes Comorbidity  Goal: Blood Glucose Level Within Targeted Range  Outcome: Ongoing, Progressing     Problem: Infection  Goal: Absence of Infection Signs and Symptoms  Outcome: Ongoing, Progressing     Problem: Hypertension Comorbidity  Goal: Blood Pressure in Desired Range  Outcome: Ongoing, Progressing

## 2023-05-20 NOTE — PROGRESS NOTES
Virtual Nurse:Discharge orders noted; additional clinical references attached.  and notified.  Patient's discharge instruction packet given by bedside RN.    Cued into patient's room.  Permission received per patient to turn camera to view patient.  Introduced as VN that will be instructing on discharge instructions. Educated patient on reason for admission; medications to hold, continue, and start, appointment to follow-up with doctor, and when to return to ED. Teach back method used. Verbalized understanding  Number given for 24/7 Nurse Line. Opportunity given for questions and questions answered.  Bedside nurse updated.      05/20/23 1640   Admission   Communication Issues? None   Shift   Virtual Nurse - Patient Verbalized Approval Of Camera Use;VN Rounding

## 2023-05-20 NOTE — PROGRESS NOTES
Surgery follow up  BP (!) 147/77   Pulse 82   Temp 98 °F (36.7 °C)   Resp 18   Ht 6' (1.829 m)   Wt 113.2 kg (249 lb 9 oz)   SpO2 98%   BMI 33.85 kg/m²   I/O last 3 completed shifts:  In: 720 [P.O.:720]  Out: 300 [Urine:300]  No intake/output data recorded.    Recent Results (from the past 336 hour(s))   CBC with Automated Differential    Collection Time: 05/20/23  6:25 AM   Result Value Ref Range    WBC 3.57 (L) 3.90 - 12.70 K/uL    Hemoglobin 9.8 (L) 14.0 - 18.0 g/dL    Hematocrit 28.9 (L) 40.0 - 54.0 %    Platelets 122 (L) 150 - 450 K/uL   CBC with Automated Differential    Collection Time: 05/19/23  3:30 AM   Result Value Ref Range    WBC 3.83 (L) 3.90 - 12.70 K/uL    Hemoglobin 9.6 (L) 14.0 - 18.0 g/dL    Hematocrit 28.4 (L) 40.0 - 54.0 %    Platelets 127 (L) 150 - 450 K/uL   CBC auto differential    Collection Time: 05/18/23  9:35 AM   Result Value Ref Range    WBC 4.80 3.90 - 12.70 K/uL    Hemoglobin 11.3 (L) 14.0 - 18.0 g/dL    Hematocrit 33.8 (L) 40.0 - 54.0 %    Platelets 142 (L) 150 - 450 K/uL     Asymptomatics , on daily dressing changes , awaiting cultures.

## 2023-05-20 NOTE — DISCHARGE SUMMARY
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  He was diagnosed with T-cell lymphoma August of 2022.  The wound started around the size of an acorn at this time and then gradually increased in size since.  He has taken Bactrim multiple times over the past year, and is currently taking this regimen for the wound.  He has a left axilla tumor which has received radiation July 2022.  He also has a right lower extremity tumor and DVT for which he is on Eliquis.  Is currently taking acyclovir for shingles prevention.  He denies headache, vision changes, dizziness, lightheadedness, chest pain, shortness of breath, abdominal pain, nausea, vomiting, diarrhea, fever, chill, cough, urinary changes, alcohol, tobacco, or illicit drugs.      * No surgery found *      Hospital Course:   No notes on file     Goals of Care Treatment Preferences:  Code Status: Full Code      Consults:   Consults (From admission, onward)        Status Ordering Provider     Inpatient virtual consult to Hospital Medicine  Once        Provider:  (Not yet assigned)    Acknowledged GUERDA ASTORGA     Inpatient consult to Registered Dietitian/Nutritionist  Once        Provider:  (Not yet assigned)    Cheikh COTTRELL  NEYMAR N.     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  Last A1c reviewed-   Lab Results   Component Value Date    HGBA1C 4.9 05/18/2023     Most recent fingerstick glucose reviewed-   Recent Labs   Lab 05/19/23  1539 05/19/23  1939 05/20/23  0457 05/20/23  1218   POCTGLUCOSE 103 127* 103 118*     Current correctional scale  Low  Maintain anti-hyperglycemic dose as follows-   Antihyperglycemics (From admission, onward)    Start     Stop Route Frequency Ordered    05/18/23 1304  insulin aspart U-100 pen 0-5 Units         -- SubQ Every 6 hours PRN 05/18/23 1205        Hold Oral hypoglycemics while patient is in the hospital.    Orthopedic  * Open wound of left elbow  No evidence of acute fracture or dislocation.  Minor enthesopathic change about the elbow joint.  No large elbow joint effusion is seen.     Skin ulceration is noted of the dorsal and ulnar aspect of the arm, in keeping with known mass lesion in this location.  Appears to be overlying bandage material.      Nonhealing necrotic wound to the left elbow.  Site of tumor necrosis.  Patient has taken multiple courses of p.o.  Bactrim  Wound continues to grow in size and is nonhealing  Blood cultures NGTD  Wound cx taken per general surgery   Check ESR 32, CRP 34.6  Patient is nonseptic appearing  Continue IV cefepime   History allergy to vancomycin with red man syndrome  Consult ID, Hematology, orthopedic surgery  Pain control- increase pain regime today     ID changed abx to IV ancef and PO doxycycline   Per ortho- think IV antibiotics and wound care as a 1st step but  do not know if this can be treated without surgical amputation of the left arm   Patient is not interested in surgery   Per hemoc- abx, wound care, patient to go to [facility] 6/6   General surgery consulted and managing wound/wound care  -awaiting blood/wound cx to de-escalate/transiton to PO abx- appreciate ID recs     Per ID: Patient stable and all cultures negative  - no compelling reason for IV abx at this time      Rec:   1. Stop cefazolin and doxy   2. Increase bactrim DS to 1 PO bid for 14 days until seen at [facility] - real treatment is XRT       Ok to dc home   Continue wound care at home- rx sent for gent and santyl ointments. Nursing to teach wound care to patient and spouse before discharge.   Referrals sent to general surgery, ortho, and priority care clinics,   Patient to follow up at [facility]   Continue home pain regime     Gout of multiple sites    Continue allopurinol    Other  Acute deep vein thrombosis (DVT) of right lower extremity    Present prior to admission  Hold Eliquis at this time pending surgical procedure  Will cover with prophylactic dose heparin  Resume Eliquis as soon as possible- resumed 5/19      Final Active Diagnoses:    Diagnosis Date Noted POA    PRINCIPAL PROBLEM:  Open wound of left elbow [S51.002A] 05/18/2023 Unknown    Acute deep vein thrombosis (DVT) of right lower extremity [I82.401] 05/18/2023 Unknown    Lymphoma [C85.90] 05/18/2023 Yes    Gout of multiple sites [M10.9] 11/28/2022 Yes    Type 2 diabetes mellitus  without complication, without long-term current use of insulin [E11.9] 11/28/2022 Yes    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           [facility]    Specialties: Oncology, Otolaryngology, Hematology and Oncology  Contact information:  [address] [place] [address] [id number] [phone]                       Patient Instructions:      Ambulatory referral/consult to Orthopedics   Standing Status: Future   Referral Priority: Routine Referral Type: Consultation   Requested Specialty: Orthopedic Surgery   Number of Visits Requested: 1     Ambulatory referral/consult to General Surgery   Standing Status: Future   Referral Priority: Routine Referral Type: Consultation   Referral Reason: Specialty Services Required   Requested Specialty: General Surgery   Number of Visits Requested: 1     Ambulatory referral/consult to Priority Clinic   Standing Status: Future   Referral Priority: Routine Referral Type: Consultation   Referral Reason: Specialty Services Required   Number of Visits Requested: 1     Diet Adult Regular     Notify your health care provider if you experience any of the following:  temperature >100.4     Notify your health care provider if you experience any of the following:  persistent nausea and vomiting or diarrhea     Notify your health care provider if you experience any of the following:  severe uncontrolled pain     Notify your health care provider if you experience any of the following:  redness, tenderness, or signs of infection (pain, swelling, redness, odor or green/yellow discharge around incision site)     Notify your health care provider if you experience any of the following:   difficulty breathing or increased cough     Notify your health care provider if you experience any of the following:  severe persistent headache     Notify your health care provider if you experience any of the following:  worsening rash     Notify your health care provider if you experience any of the following:  persistent dizziness, light-headedness, or visual disturbances     Notify your health care provider if you experience any of the following:  increased confusion or weakness     Change dressing (specify)   Order Comments: Cleanse wound with Vashe and redress with Santyl , gentamycin ointment , xeroform kerlix wrap daily     Activity as tolerated       Significant Diagnostic Studies: Labs:   CMP   Recent Labs   Lab 05/19/23  0330 05/20/23  0625    139   K 4.1 3.8    107   CO2 24 24   GLU 98 98   BUN 8 9   CREATININE 0.8 0.8   CALCIUM 9.2 9.2   PROT 5.7* 5.7*   ALBUMIN 3.1* 3.1*   BILITOT 0.3 0.3   ALKPHOS 64 63   AST 31 28   ALT 28 24   ANIONGAP 8 8    and CBC   Recent Labs   Lab 05/19/23 0330 05/20/23  0625   WBC 3.83* 3.57*   HGB 9.6* 9.8*   HCT 28.4* 28.9*   * 122*       Pending Diagnostic Studies:     None         Medications:  Reconciled Home Medications:      Medication List      START taking these medications    collagenase ointment  Commonly known as: SANTYL  Apply topically once daily.  Start taking on: May 21, 2023     gentamicin 0.1 % ointment  Commonly known as: GARAMYCIN  Apply topically 3 (three) times daily.     sodium chlor-hypochlorous acid 0.033 % Irsl  Irrigate with 1,000 mLs as directed once daily.        CHANGE how you take these medications    allopurinoL 300 MG tablet  Commonly known as: ZYLOPRIM  TAKE 1 TABLET BY MOUTH 2 TIMES DAILY.  What changed: when to take this     sulfamethoxazole-trimethoprim 800-160mg 800-160 mg Tab  Commonly known as: BACTRIM DS  Take 1 tablet by mouth 2 (two) times daily. for 14 days  What changed: when to take this        CONTINUE taking  these medications    apixaban 5 mg Tab  Commonly known as: ELIQUIS  Take 1 tablet (5 mg total) by mouth 2 (two) times daily.     clobetasol 0.05% 0.05 % Oint  Commonly known as: TEMOVATE  Apply topically.     ibuprofen 200 MG tablet  Commonly known as: ADVIL,MOTRIN  Take 600 mg by mouth as needed for Pain.     mupirocin 2 % ointment  Commonly known as: BACTROBAN  Apply topically.     ondansetron 8 MG tablet  Commonly known as: ZOFRAN  Take 8 mg by mouth.     oxyCODONE 15 MG Tab  Commonly known as: ROXICODONE  Take 7.5 mg by mouth.     prochlorperazine 10 MG tablet  Commonly known as: COMPAZINE  Take 10 mg by mouth.     triamcinolone acetonide 0.1% 0.1 % cream  Commonly known as: KENALOG  Apply topically.     valACYclovir 500 MG tablet  Commonly known as: VALTREX  Take 1 tablet (500 mg total) by mouth 2 (two) times daily.        STOP taking these medications    predniSONE 50 MG Tab  Commonly known as: DELTASONE            Indwelling Lines/Drains at time of discharge:   Lines/Drains/Airways     Central Venous Catheter Line  Duration                PowerPort A Cath Single Lumen 01/05/23 1114 right internal jugular 135 days                Time spent on the discharge of patient: 60 minutes         La Wren NP  Department of Hospital Medicine  [facility]

## 2023-05-20 NOTE — SUBJECTIVE & OBJECTIVE
Interval hx: pain controlled to wound today. Receiving wound care. Awaiting cultures for abx de-escalation.     Review of Systems  Objective:     Vital Signs (Most Recent):  Temp: 98 °F (36.7 °C) (05/20/23 1215)  Pulse: 82 (05/20/23 1215)  Resp: 18 (05/20/23 1215)  BP: (!) 147/77 (05/20/23 1215)  SpO2: 98 % (05/20/23 1215) Vital Signs (24h Range):  Temp:  [96.8 °F (36 °C)-98.6 °F (37 °C)] 98 °F (36.7 °C)  Pulse:  [57-89] 82  Resp:  [17-20] 18  SpO2:  [96 %-98 %] 98 %  BP: (120-147)/(65-77) 147/77     Weight: 113.2 kg (249 lb 9 oz)  Body mass index is 33.85 kg/m².     Physical Exam  Constitutional:       Appearance: Normal appearance.   HENT:      Head: Normocephalic and atraumatic.      Nose: Nose normal.      Mouth/Throat:      Mouth: Mucous membranes are moist.      Pharynx: Oropharynx is clear.   Eyes:      Extraocular Movements: Extraocular movements intact.      Conjunctiva/sclera: Conjunctivae normal.   Cardiovascular:      Rate and Rhythm: Normal rate and regular rhythm.      Pulses: Normal pulses.      Heart sounds: Normal heart sounds.   Pulmonary:      Effort: Pulmonary effort is normal.      Breath sounds: Normal breath sounds.   Abdominal:      General: Abdomen is flat. Bowel sounds are normal.      Palpations: Abdomen is soft.   Musculoskeletal:         General: Normal range of motion.      Cervical back: Normal range of motion and neck supple.   Skin:     General: Skin is warm and dry.      Comments: Left axilla tumor with erythema    Left elbow necrotic wound please see clinical image    Right lower extremity edematous related to tumor and known DVT   Neurological:      General: No focal deficit present.      Mental Status: He is alert and oriented to person, place, and time.                    Significant Labs: All pertinent labs within the past 24 hours have been reviewed.    Significant Imaging: I have reviewed all pertinent imaging results/findings within the past 24 hours.

## 2023-05-20 NOTE — ASSESSMENT & PLAN NOTE
No evidence of acute fracture or dislocation.  Minor enthesopathic change about the elbow joint.  No large elbow joint effusion is seen.     Skin ulceration is noted of the dorsal and ulnar aspect of the arm, in keeping with known mass lesion in this location.  Appears to be overlying bandage material.      Nonhealing necrotic wound to the left elbow.  Site of tumor necrosis.  Patient has taken multiple courses of p.o. Bactrim  Wound continues to grow in size and is nonhealing  Blood cultures pending  Wound cx taken per general surgery   Check ESR 32, CRP 34.6  Patient is nonseptic appearing  Continue IV cefepime   History allergy to vancomycin with red man syndrome  Consult ID, Hematology, orthopedic surgery  Pain control- increase pain regime today     ID changed abx to IV ancef and PO doxycycline   Per ortho- think IV antibiotics and wound care as a 1st step but  do not know if this can be treated without surgical amputation of the left arm   Patient is not interested in surgery   Per hemoc- abx, wound care, patient to go to [facility] 6[facility]   General surgery consulted and managing wound/wound care  -awaiting blood/wound cx to de-escalate/transiton to PO abx- appreciate ID recs

## 2023-05-20 NOTE — PLAN OF CARE
Patient stable and all cultures negative  - no compelling reason for IV abx at this time     Rec:   1. Stop cefazolin and doxy   2. Increase bactrim DS to 1 PO bid for 14 days until seen at [facility] - real treatment is XRT

## 2023-05-20 NOTE — ASSESSMENT & PLAN NOTE
Patient's FSGs are controlled on current medication regimen.  Last A1c reviewed-   Lab Results   Component Value Date    HGBA1C 4.9 05/18/2023     Most recent fingerstick glucose reviewed-   Recent Labs   Lab 05/19/23  1539 05/19/23  1939 05/20/23  0457 05/20/23  1218   POCTGLUCOSE 103 127* 103 118*     Current correctional scale  Low  Maintain anti-hyperglycemic dose as follows-   Antihyperglycemics (From admission, onward)    Start     Stop Route Frequency Ordered    05/18/23 1304  insulin aspart U-100 pen 0-5 Units         -- SubQ Every 6 hours PRN 05/18/23 1205        Hold Oral hypoglycemics while patient is in the hospital.

## 2023-05-20 NOTE — PROGRESS NOTES
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  He was diagnosed with T-cell lymphoma August of 2022.  The wound started around the size of an acorn at this time and then gradually increased in size since.  He has taken Bactrim multiple times over the past year, and is currently taking this regimen for the wound.  He has a left axilla tumor which has received radiation July 2022.  He also has a right lower extremity tumor and DVT for which he is on Eliquis.  Is currently taking acyclovir for shingles prevention.  He denies headache, vision changes, dizziness, lightheadedness, chest pain, shortness of breath, abdominal pain, nausea, vomiting, diarrhea, fever, chill, cough, urinary changes, alcohol, tobacco, or illicit drugs.      Overview/Hospital Course:  No notes on file    Interval hx: pain controlled to wound today. Receiving wound care. Awaiting cultures for abx de-escalation.     Review of Systems  Objective:     Vital Signs (Most Recent):  Temp: 98 °F (36.7 °C) (05/20/23 1215)  Pulse: 82 (05/20/23 1215)  Resp: 18 (05/20/23 1215)  BP: (!) 147/77 (05/20/23 1215)  SpO2: 98 % (05/20/23 1215) Vital Signs (24h Range):  Temp:  [96.8 °F (36 °C)-98.6 °F (37 °C)] 98 °F (36.7 °C)  Pulse:  [57-89] 82  Resp:  [17-20] 18  SpO2:  [96 %-98 %] 98 %  BP: (120-147)/(65-77) 147/77     Weight: 113.2 kg (249 lb 9 oz)  Body  mass index is 33.85 kg/m².     Physical Exam  Constitutional:       Appearance: Normal appearance.   HENT:      Head: Normocephalic and atraumatic.      Nose: Nose normal.      Mouth/Throat:      Mouth: Mucous membranes are moist.      Pharynx: Oropharynx is clear.   Eyes:      Extraocular Movements: Extraocular movements intact.      Conjunctiva/sclera: Conjunctivae normal.   Cardiovascular:      Rate and Rhythm: Normal rate and regular rhythm.      Pulses: Normal pulses.      Heart sounds: Normal heart sounds.   Pulmonary:      Effort: Pulmonary effort is normal.      Breath sounds: Normal breath sounds.   Abdominal:      General: Abdomen is flat. Bowel sounds are normal.      Palpations: Abdomen is soft.   Musculoskeletal:         General: Normal range of motion.      Cervical back: Normal range of motion and neck supple.   Skin:     General: Skin is warm and dry.      Comments: Left axilla tumor with erythema    Left elbow necrotic wound please see clinical image    Right lower extremity edematous related to tumor and known DVT   Neurological:      General: No focal deficit present.      Mental Status: He is alert and oriented to person, place, and time.                    Significant Labs: All pertinent labs within the past 24 hours have been reviewed.    Significant Imaging: I have reviewed all pertinent imaging results/findings within the past 24 hours.      Assessment/Plan:      * Open wound of left elbow  No evidence of acute fracture or dislocation.  Minor enthesopathic change about the elbow joint.  No large elbow joint effusion is seen.     Skin ulceration is noted of the dorsal and ulnar aspect of the arm, in keeping with known mass lesion in this location.  Appears to be overlying bandage material.      Nonhealing necrotic wound to the left elbow.  Site of tumor necrosis.  Patient has taken multiple courses of p.o. Bactrim  Wound continues to grow in size and is nonhealing  Blood cultures  pending  Wound cx taken per general surgery   Check ESR 32, CRP 34.6  Patient is nonseptic appearing  Continue IV cefepime   History allergy to vancomycin with red man syndrome  Consult ID, Hematology, orthopedic surgery  Pain control- increase pain regime today     ID changed abx to IV ancef and PO doxycycline   Per ortho- think IV antibiotics and wound care as a 1st step but  do not know if this can be treated without surgical amputation of the left arm   Patient is not interested in surgery   Per hemoc- abx, wound care, patient to go to [facility] 6/6   General surgery consulted and managing wound/wound care  -awaiting blood/wound cx to de-escalate/transiton to PO abx- appreciate ID recs     Lymphoma        Acute deep vein thrombosis (DVT) of right lower extremity    Present prior to admission  Hold Eliquis at this time pending surgical procedure  Will cover with prophylactic dose heparin  Resume Eliquis as soon as possible- resumed 5/19    Mycosis fungoides    Noted history    Type 2 diabetes mellitus without complication, without long-term current use of insulin  Patient's FSGs are controlled on current medication regimen.  Last A1c reviewed-   Lab Results   Component Value Date    HGBA1C 4.9 05/18/2023     Most recent fingerstick glucose reviewed-   Recent Labs   Lab 05/19/23  1539 05/19/23  1939 05/20/23  0457 05/20/23  1218   POCTGLUCOSE 103 127* 103 118*     Current correctional scale  Low  Maintain anti-hyperglycemic dose as follows-   Antihyperglycemics (From admission, onward)    Start     Stop Route Frequency Ordered    05/18/23 1304  insulin aspart U-100 pen 0-5 Units         -- SubQ Every 6 hours PRN 05/18/23 1205        Hold Oral hypoglycemics while patient is in the hospital.    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    Status post chemo/ radiation  Follows [facility] and [facility]      VTE Risk Mitigation (From admission,  onward)         Ordered     apixaban tablet 5 mg  2 times daily         05/19/23 1213     IP VTE HIGH RISK PATIENT  Once         05/18/23 1203     Place sequential compression device  Until discontinued         05/18/23 1203                Discharge Planning   CHARLINE:      Code Status: Full Code   Is the patient medically ready for discharge?:     Reason for patient still in hospital (select all that apply): Patient trending condition  Discharge Plan A: Home with family, Home                  La Wren NP  Department of [facility]   [facility]

## 2023-05-20 NOTE — ASSESSMENT & PLAN NOTE
No evidence of acute fracture or dislocation.  Minor enthesopathic change about the elbow joint.  No large elbow joint effusion is seen.     Skin ulceration is noted of the dorsal and ulnar aspect of the arm, in keeping with known mass lesion in this location.  Appears to be overlying bandage material.      Nonhealing necrotic wound to the left elbow.  Site of tumor necrosis.  Patient has taken multiple courses of p.o. Bactrim  Wound continues to grow in size and is nonhealing  Blood cultures NGTD  Wound cx taken per general surgery   Check ESR 32, CRP 34.6  Patient is nonseptic appearing  Continue IV cefepime   History allergy to vancomycin with red man syndrome  Consult ID, Hematology, orthopedic surgery  Pain control- increase pain regime today     ID changed abx to IV ancef and PO doxycycline   Per ortho- think IV antibiotics and wound care as a 1st step but  do not know if this can be treated without surgical amputation of the left arm   Patient is not interested in surgery   Per hemoc- abx, wound care, patient to go to [facility] 6/6   General surgery consulted and managing wound/wound care  -awaiting blood/wound cx to de-escalate/transiton to PO abx- appreciate ID recs     Per ID: Patient stable and all cultures negative  - no compelling reason for IV abx at this time      Rec:   1. Stop cefazolin and doxy   2. Increase bactrim DS to 1 PO bid for 14 days until seen at [facility] - real treatment is XRT       Ok to dc home   Continue wound care at home- rx sent for gent and santyl ointments. Nursing to teach wound care to patient and spouse before discharge.   Referrals sent to general surgery, ortho, and priority care clinics,   Patient to follow up at [facility]   Continue home pain regime

## 2023-05-20 NOTE — ASSESSMENT & PLAN NOTE
Status post chemo/ radiation  Follows [facility] and [facility] 6/6

## 2023-05-20 NOTE — PLAN OF CARE
Problem: Adult Inpatient Plan of Care  Goal: Plan of Care Review  Outcome: Ongoing, Progressing  Goal: Patient-Specific Goal (Individualized)  Outcome: Ongoing, Progressing  Goal: Absence of Hospital-Acquired Illness or Injury  Outcome: Ongoing, Progressing  Goal: Optimal Comfort and Wellbeing  Outcome: Ongoing, Progressing  Goal: Readiness for Transition of Care  Outcome: Ongoing, Progressing     Problem: Diabetes Comorbidity  Goal: Blood Glucose Level Within Targeted Range  Outcome: Ongoing, Progressing     Problem: Infection  Goal: Absence of Infection Signs and Symptoms  Outcome: Ongoing, Progressing     Problem: Hypertension Comorbidity  Goal: Blood Pressure in Desired Range  Outcome: Ongoing, Progressing     Problem: Fall Injury Risk  Goal: Absence of Fall and Fall-Related Injury  Outcome: Ongoing, Progressing     Problem: Impaired Wound Healing  Goal: Optimal Wound Healing  Outcome: Ongoing, Progressing       POC reviewed with pt, following- VSS, NADN, pt resting quietly this shift. IV ABX per order. PRN medication given for pain. No falls or injuries noted, CB in reach at all times.

## 2023-05-20 NOTE — PLAN OF CARE
Pt will dc with no needs noted. Pt reports that he has a ride home. Pt did not have any questions or concerns for SW.     Cleared from CM . Bedside Nurse and VN notified.    SW requested General surg follow up  SW requested Orthopedics follow up    Future Appointments   Date Time Provider Department Center   6/1/2023  7:20 AM Angely Sofia MD [facility]        05/20/23 7917   Final Note   Assessment Type Final Discharge Note   Anticipated Discharge Disposition Home   Hospital Resources/Appts/Education Provided Appointments scheduled by Navigator/Coordinator   Post-Acute Status   Discharge Delays None known at this time

## 2023-05-20 NOTE — ASSESSMENT & PLAN NOTE
Present prior to admission  Hold Eliquis at this time pending surgical procedure  Will cover with prophylactic dose heparin  Resume Eliquis as soon as possible- resumed 5/19

## 2023-05-22 ENCOUNTER — TELEPHONE (OUTPATIENT)
Dept: ADMINISTRATIVE | Facility: OTHER | Age: 73
End: 2023-05-22
Payer: MEDICARE

## 2023-05-22 ENCOUNTER — PATIENT OUTREACH (OUTPATIENT)
Dept: ADMINISTRATIVE | Facility: CLINIC | Age: 73
End: 2023-05-22
Payer: MEDICARE

## 2023-05-22 DIAGNOSIS — S51.002A OPEN WOUND OF LEFT ELBOW, INITIAL ENCOUNTER: Primary | ICD-10-CM

## 2023-05-22 RX ORDER — GENTAMICIN SULFATE 1 MG/G
OINTMENT TOPICAL 3 TIMES DAILY
Qty: 30 G | Refills: 1 | Status: SHIPPED | OUTPATIENT
Start: 2023-05-22

## 2023-05-22 NOTE — PROGRESS NOTES
C3 nurse spoke with Randy Armstrong  for a TCC post hospital discharge follow up call. Nurse offered to scheduled TCC hospital follow-up appointment. The patient declined appointment at this time.

## 2023-05-23 LAB
BACTERIA BLD CULT: NORMAL
BACTERIA BLD CULT: NORMAL
BACTERIA SPEC AEROBE CULT: ABNORMAL
BACTERIA SPEC AEROBE CULT: ABNORMAL

## 2023-05-24 LAB — BACTERIA SPEC ANAEROBE CULT: NORMAL

## 2023-06-01 ENCOUNTER — PATIENT OUTREACH (OUTPATIENT)
Dept: ADMINISTRATIVE | Facility: HOSPITAL | Age: 73
End: 2023-06-01
Payer: MEDICARE

## 2023-06-01 ENCOUNTER — OFFICE VISIT (OUTPATIENT)
Dept: FAMILY MEDICINE | Facility: CLINIC | Age: 73
End: 2023-06-01
Payer: MEDICARE

## 2023-06-01 VITALS
SYSTOLIC BLOOD PRESSURE: 160 MMHG | HEIGHT: 72 IN | DIASTOLIC BLOOD PRESSURE: 70 MMHG | HEART RATE: 69 BPM | WEIGHT: 240.94 LBS | OXYGEN SATURATION: 98 % | BODY MASS INDEX: 32.63 KG/M2

## 2023-06-01 DIAGNOSIS — E03.8 SUBCLINICAL HYPOTHYROIDISM: ICD-10-CM

## 2023-06-01 DIAGNOSIS — C84.09 MYCOSIS FUNGOIDES, EXTRANODAL AND SOLID ORGAN SITES: ICD-10-CM

## 2023-06-01 DIAGNOSIS — E11.59 HYPERTENSION ASSOCIATED WITH DIABETES: ICD-10-CM

## 2023-06-01 DIAGNOSIS — M1A.09X0 IDIOPATHIC CHRONIC GOUT OF MULTIPLE SITES WITHOUT TOPHUS: ICD-10-CM

## 2023-06-01 DIAGNOSIS — E11.65 TYPE 2 DIABETES MELLITUS WITH HYPERGLYCEMIA, WITHOUT LONG-TERM CURRENT USE OF INSULIN: Primary | ICD-10-CM

## 2023-06-01 DIAGNOSIS — I15.2 HYPERTENSION ASSOCIATED WITH DIABETES: ICD-10-CM

## 2023-06-01 DIAGNOSIS — M79.2 NEUROPATHIC PAIN: ICD-10-CM

## 2023-06-01 PROCEDURE — 99215 OFFICE O/P EST HI 40 MIN: CPT | Mod: PBBFAC,PO | Performed by: FAMILY MEDICINE

## 2023-06-01 PROCEDURE — 99214 OFFICE O/P EST MOD 30 MIN: CPT | Mod: S$PBB,,, | Performed by: FAMILY MEDICINE

## 2023-06-01 PROCEDURE — 99999 PR PBB SHADOW E&M-EST. PATIENT-LVL V: CPT | Mod: PBBFAC,,, | Performed by: FAMILY MEDICINE

## 2023-06-01 PROCEDURE — 99999 PR PBB SHADOW E&M-EST. PATIENT-LVL V: ICD-10-PCS | Mod: PBBFAC,,, | Performed by: FAMILY MEDICINE

## 2023-06-01 PROCEDURE — 99214 PR OFFICE/OUTPT VISIT, EST, LEVL IV, 30-39 MIN: ICD-10-PCS | Mod: S$PBB,,, | Performed by: FAMILY MEDICINE

## 2023-06-01 RX ORDER — LOSARTAN POTASSIUM 25 MG/1
25 TABLET ORAL DAILY
Qty: 90 TABLET | Refills: 0 | Status: SHIPPED | OUTPATIENT
Start: 2023-06-01

## 2023-06-01 RX ORDER — BLOOD-GLUCOSE TRANSMITTER
1 EACH MISCELLANEOUS
Qty: 1 EACH | Refills: 3 | Status: SHIPPED | OUTPATIENT
Start: 2023-06-01 | End: 2023-06-01

## 2023-06-01 RX ORDER — PREGABALIN 75 MG/1
75 CAPSULE ORAL 2 TIMES DAILY
Qty: 60 CAPSULE | Refills: 0 | Status: SHIPPED | OUTPATIENT
Start: 2023-06-01

## 2023-06-01 RX ORDER — LOSARTAN POTASSIUM 25 MG/1
25 TABLET ORAL DAILY
Qty: 90 TABLET | Refills: 0 | Status: SHIPPED | OUTPATIENT
Start: 2023-06-01 | End: 2023-06-01

## 2023-06-01 RX ORDER — BLOOD-GLUCOSE,RECEIVER,CONT
1 EACH MISCELLANEOUS CONTINUOUS PRN
Qty: 1 EACH | Refills: 0 | Status: SHIPPED | OUTPATIENT
Start: 2023-06-01 | End: 2024-05-31

## 2023-06-01 RX ORDER — PREGABALIN 75 MG/1
75 CAPSULE ORAL 2 TIMES DAILY
Qty: 60 CAPSULE | Refills: 0 | Status: SHIPPED | OUTPATIENT
Start: 2023-06-01 | End: 2023-06-01

## 2023-06-01 RX ORDER — BLOOD-GLUCOSE SENSOR
1 EACH MISCELLANEOUS
Qty: 3 EACH | Refills: 11 | Status: SHIPPED | OUTPATIENT
Start: 2023-06-01 | End: 2023-06-01

## 2023-06-01 RX ORDER — BLOOD-GLUCOSE TRANSMITTER
1 EACH MISCELLANEOUS
Qty: 1 EACH | Refills: 3 | Status: SHIPPED | OUTPATIENT
Start: 2023-06-01 | End: 2024-05-31

## 2023-06-01 RX ORDER — BLOOD-GLUCOSE,RECEIVER,CONT
1 EACH MISCELLANEOUS CONTINUOUS PRN
Qty: 1 EACH | Refills: 0 | Status: SHIPPED | OUTPATIENT
Start: 2023-06-01 | End: 2023-06-01

## 2023-06-01 RX ORDER — BLOOD-GLUCOSE SENSOR
1 EACH MISCELLANEOUS
Qty: 3 EACH | Refills: 11 | Status: SHIPPED | OUTPATIENT
Start: 2023-06-01 | End: 2024-05-31

## 2023-06-01 NOTE — PATIENT INSTRUCTIONS
For your blood pressure, take Losartan 25mg daily. Check BP daily.     - Keep a home blood pressure log. Measure blood pressure in the morning after waking up and emptying bladder, before any coffee, breakfast or cigarettes if regular smoker. Sit down in a hard chair with a back, feet flat on the floor for 5 min before taking blood pressure reading. Will review blood pressure log at upcoming visit. Contact office if blood pressure very elevated consistently for sooner follow up if necessary.     For your sugar, we're trying to do some better monitoring by getting a DEXCOM monitoring system because the A1C is hard to use when there is anemia from the chemo and lymphoma.     Lyrica (pregabalin) - You can take this twice per day as needed to see if it helps with pain.

## 2023-06-01 NOTE — PROGRESS NOTES
Non-compliant GAP report chart review -  06/01/2023  Chart review completed for the following  test if overdue  (Colonoscopy)   Care Everywhere and Media reports - updates requested and reviewed.  [facility] and [facility] reviewed. DIS reviewed for test needed.   Portal outreached regarding Health maintenance

## 2023-06-01 NOTE — PROGRESS NOTES
Subjective:       Patient ID: Randy Armstrong is a 72 y.o. male.    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 Presents today for follow up of chronic medical problems.   Overall goal is for some pain management.    Last chemo - last April   Reviewed Heme/Onc note.     Plan is for patient to go to [facility]. He states will be going on 6/22.   Unclear plan from there in terms of when he would return.   Ex-wife has been helpful. Has some adult children. Unclear of support system available.     Review of Systems   All other systems reviewed and are negative.       Objective:     Vitals:    06/01/23 0717   BP: (!) 160/70   Pulse:      Physical Exam  Vitals and nursing note reviewed.   Constitutional:       General: He is not in acute distress.     Appearance: Normal appearance. He is obese. He is not ill-appearing, toxic-appearing or diaphoretic.   HENT:      Head: Normocephalic and atraumatic.   Eyes:      General: No scleral icterus.     Conjunctiva/sclera: Conjunctivae normal.   Cardiovascular:      Rate and Rhythm: Normal rate.   Pulmonary:      Effort: Pulmonary effort is normal. No respiratory distress.   Skin:     Coloration: Skin is not pale.      Comments: Arm with lesions and dressing   Neurological:      Mental Status: He is alert. Mental status is at baseline.   Psychiatric:         Attention and Perception: Attention and perception normal.         Mood and Affect: Mood and affect normal.         Speech: Speech normal.         Behavior: Behavior normal.          Cognition and Memory: Cognition and memory normal.         Judgment: Judgment normal.       Assessment:       1. Type 2 diabetes mellitus with hyperglycemia, without long-term current use of insulin    2. Hypertension associated with diabetes    3. Idiopathic chronic gout of multiple sites without tophus    4. Mycosis fungoides, extranodal and solid organ sites    5. Neuropathic pain        Plan:   Recent relevant labs results reviewed with patient.       1. Type 2 diabetes mellitus with hyperglycemia, without long-term current use of insulin  -     TSH; Future; Expected date: 06/01/2023  -     blood-glucose meter,continuous (DEXCOM G6 ) Misc; 1 each by Misc.(Non-Drug; Combo Route) route continuous prn (to check blood glucose).  Dispense: 1 each; Refill: 0  -     blood-glucose sensor (DEXCOM G6 SENSOR) Mariya; 1 Device by Misc.(Non-Drug; Combo Route) route every 30 days.  Dispense: 3 each; Refill: 11  -     blood-glucose transmitter (DEXCOM G6 TRANSMITTER) Mariya; 1 Device by Misc.(Non-Drug; Combo Route) route every 3 (three) months.  Dispense: 1 each; Refill: 3  - 4.9 A1C on 05/18/2023. However has longstanding anemia. Would benefit from CGM if covered to better manage diabetes.   Metformin discontinued due to GI symptoms.     2. Hypertension associated with diabetes  -     losartan (COZAAR) 25 MG tablet; Take 1 tablet (25 mg total) by mouth once daily. High blood pressure  Dispense: 90 tablet; Refill: 0  - Confounded by constant pain. Has not been taking meds consistently.     3. Idiopathic chronic gout of multiple sites without tophus  -     URIC ACID; Future; Expected date: 06/01/2023    4. Mycosis fungoides, extranodal and solid organ sites  -     Ambulatory referral/consult to Family Practice    5. Neuropathic pain  -     pregabalin (LYRICA) 75 MG capsule; Take 1 capsule (75 mg total) by mouth 2 (two) times daily. For pain  Dispense: 60 capsule; Refill: 0  - Gabapentin in past without any relief per  patient.     Patient's questions answered. Plan reviewed with patient at the end of visit. Relevant precautions to chief complaint and reasons to seek further medical care or to contact the office sooner reviewed with patient.     Follow up in about 4 days (around 6/5/2023) for f/u BP .

## 2023-06-02 ENCOUNTER — PATIENT MESSAGE (OUTPATIENT)
Dept: FAMILY MEDICINE | Facility: CLINIC | Age: 73
End: 2023-06-02
Payer: MEDICARE

## 2023-06-05 ENCOUNTER — TELEPHONE (OUTPATIENT)
Dept: PHARMACY | Facility: CLINIC | Age: 73
End: 2023-06-05
Payer: MEDICARE

## 2023-06-06 ENCOUNTER — PATIENT MESSAGE (OUTPATIENT)
Dept: ADMINISTRATIVE | Facility: HOSPITAL | Age: 73
End: 2023-06-06
Payer: MEDICARE

## 2023-06-08 ENCOUNTER — PATIENT MESSAGE (OUTPATIENT)
Dept: FAMILY MEDICINE | Facility: CLINIC | Age: 73
End: 2023-06-08
Payer: MEDICARE

## 2023-06-08 ENCOUNTER — TELEPHONE (OUTPATIENT)
Dept: FAMILY MEDICINE | Facility: CLINIC | Age: 73
End: 2023-06-08
Payer: MEDICARE

## 2023-06-08 NOTE — TELEPHONE ENCOUNTER
Called patient regarding documented weight gain. Inquired as to when he is available to come in and see Dr. Sofia. Patient states he will send me a message via the patient portal this afternoon, so that we can set up his follow-up.

## 2023-06-09 ENCOUNTER — PATIENT MESSAGE (OUTPATIENT)
Dept: HEMATOLOGY/ONCOLOGY | Facility: CLINIC | Age: 73
End: 2023-06-09
Payer: MEDICARE

## 2023-06-09 ENCOUNTER — PATIENT MESSAGE (OUTPATIENT)
Dept: FAMILY MEDICINE | Facility: CLINIC | Age: 73
End: 2023-06-09
Payer: MEDICARE

## 2023-06-15 ENCOUNTER — PATIENT MESSAGE (OUTPATIENT)
Dept: FAMILY MEDICINE | Facility: CLINIC | Age: 73
End: 2023-06-15
Payer: MEDICARE

## 2023-06-15 DIAGNOSIS — G89.3 CANCER-RELATED PAIN: Primary | ICD-10-CM

## 2023-06-16 RX ORDER — TRAMADOL HYDROCHLORIDE 50 MG/1
50 TABLET ORAL EVERY 6 HOURS PRN
Qty: 60 TABLET | Refills: 0 | Status: SHIPPED | OUTPATIENT
Start: 2023-06-16

## 2023-06-16 NOTE — TELEPHONE ENCOUNTER
Called and spoke patient. Pain from cancer related non healing wound on arm.   Tramadol and oxycodone worked in past. Pregabalin with little relief. Gabapentin did not work.     Will be leaving for [place] this weekend. Starting next week with BM bx and will be in clinical trial for lymphoma.

## 2023-06-19 ENCOUNTER — PATIENT MESSAGE (OUTPATIENT)
Dept: FAMILY MEDICINE | Facility: CLINIC | Age: 73
End: 2023-06-19
Payer: MEDICARE

## 2023-06-19 PROBLEM — E03.8 SUBCLINICAL HYPOTHYROIDISM: Status: ACTIVE | Noted: 2023-06-19

## 2023-09-29 DIAGNOSIS — E11.9 TYPE 2 DIABETES MELLITUS WITHOUT COMPLICATION: ICD-10-CM

## 2023-12-06 DIAGNOSIS — E11.9 TYPE 2 DIABETES MELLITUS WITHOUT COMPLICATION: ICD-10-CM

## 2024-02-06 DIAGNOSIS — Z12.11 COLON CANCER SCREENING: ICD-10-CM

## 2024-02-16 ENCOUNTER — PATIENT MESSAGE (OUTPATIENT)
Dept: ADMINISTRATIVE | Facility: HOSPITAL | Age: 74
End: 2024-02-16
Payer: MEDICARE

## 2024-04-17 DIAGNOSIS — E11.9 TYPE 2 DIABETES MELLITUS WITHOUT COMPLICATION, UNSPECIFIED WHETHER LONG TERM INSULIN USE: ICD-10-CM

## 2024-05-17 ENCOUNTER — PATIENT MESSAGE (OUTPATIENT)
Dept: ADMINISTRATIVE | Facility: HOSPITAL | Age: 74
End: 2024-05-17
Payer: MEDICARE

## 2024-07-11 DIAGNOSIS — E11.9 TYPE 2 DIABETES MELLITUS WITHOUT COMPLICATION: ICD-10-CM

## 2024-07-11 DIAGNOSIS — E11.65 TYPE 2 DIABETES MELLITUS WITH HYPERGLYCEMIA, WITHOUT LONG-TERM CURRENT USE OF INSULIN: ICD-10-CM

## 2024-11-27 DIAGNOSIS — E11.9 TYPE 2 DIABETES MELLITUS WITHOUT COMPLICATION: ICD-10-CM

## 2025-03-10 ENCOUNTER — PATIENT MESSAGE (OUTPATIENT)
Dept: FAMILY MEDICINE | Facility: CLINIC | Age: 75
End: 2025-03-10
Payer: MEDICARE